# Patient Record
Sex: FEMALE | Race: WHITE | Employment: FULL TIME | URBAN - METROPOLITAN AREA
[De-identification: names, ages, dates, MRNs, and addresses within clinical notes are randomized per-mention and may not be internally consistent; named-entity substitution may affect disease eponyms.]

---

## 2017-01-06 RX ORDER — HYDROCHLOROTHIAZIDE 12.5 MG/1
CAPSULE, GELATIN COATED ORAL
Qty: 30 CAPSULE | Refills: 0 | Status: SHIPPED | OUTPATIENT
Start: 2017-01-06 | End: 2017-07-28

## 2017-02-10 ENCOUNTER — OFFICE VISIT (OUTPATIENT)
Dept: SURGERY | Facility: CLINIC | Age: 52
End: 2017-02-10
Attending: INTERNAL MEDICINE

## 2017-02-10 VITALS
HEIGHT: 62 IN | DIASTOLIC BLOOD PRESSURE: 87 MMHG | BODY MASS INDEX: 52.7 KG/M2 | WEIGHT: 286.38 LBS | RESPIRATION RATE: 18 BRPM | SYSTOLIC BLOOD PRESSURE: 126 MMHG | HEART RATE: 91 BPM | OXYGEN SATURATION: 95 %

## 2017-02-10 DIAGNOSIS — R60.0 BILATERAL EDEMA OF LOWER EXTREMITY: ICD-10-CM

## 2017-02-10 DIAGNOSIS — E78.1 HYPERTRIGLYCERIDEMIA: Primary | ICD-10-CM

## 2017-02-10 DIAGNOSIS — R63.2 BINGE EATING: ICD-10-CM

## 2017-02-10 DIAGNOSIS — Z99.89 OSA ON CPAP: ICD-10-CM

## 2017-02-10 DIAGNOSIS — Z51.81 ENCOUNTER FOR THERAPEUTIC DRUG MONITORING: ICD-10-CM

## 2017-02-10 DIAGNOSIS — G47.33 OSA ON CPAP: ICD-10-CM

## 2017-02-10 DIAGNOSIS — E66.01 MORBID OBESITY WITH BMI OF 50.0-59.9, ADULT (HCC): ICD-10-CM

## 2017-02-10 PROCEDURE — 99212 OFFICE O/P EST SF 10 MIN: CPT | Performed by: INTERNAL MEDICINE

## 2017-02-10 NOTE — PROGRESS NOTES
[address] [facility]  [address] [place] [address]  Dept: [phone]     Date:   2016    Patient:  Jasmin Villagran  :      10/22/1965  MRN:      [id number]    Chief Complaint:  Patient presents with: Caffeine Concern No     Social History Narrative     Surgical History:  No past surgical history on file.   Family History:    Family History   Problem Relation Age of Onset   • Pulmonary Disease       Emphysema  Family H/O   • Thyroid Disorder Mother to auscultation bilaterally  Heart: S1, S2 normal, no murmur, click, rub or gallop, regular rate and rhythm  Abdomen: soft, non-tender; bowel sounds normal; no masses,  no organomegaly  Pelvic: deferred  Extremities: edema trace    ASSESSMENT     HYPERCHOL Weekly   Recheck blood work once she is able to get insurance    Works as Mental Health Nurse. (currently seeking work)    Recommended Zoloft for SOTERO.  Wants to hold off for now    ekg done    Shila Greene MD

## 2017-04-27 ENCOUNTER — OFFICE VISIT (OUTPATIENT)
Dept: SURGERY | Facility: CLINIC | Age: 52
End: 2017-04-27

## 2017-04-27 VITALS
SYSTOLIC BLOOD PRESSURE: 136 MMHG | BODY MASS INDEX: 52.88 KG/M2 | DIASTOLIC BLOOD PRESSURE: 78 MMHG | HEART RATE: 92 BPM | RESPIRATION RATE: 18 BRPM | HEIGHT: 62 IN | WEIGHT: 287.38 LBS | OXYGEN SATURATION: 97 %

## 2017-04-27 DIAGNOSIS — G47.33 OSA ON CPAP: Primary | ICD-10-CM

## 2017-04-27 DIAGNOSIS — E55.9 VITAMIN D DEFICIENCY: ICD-10-CM

## 2017-04-27 DIAGNOSIS — R60.0 BILATERAL EDEMA OF LOWER EXTREMITY: ICD-10-CM

## 2017-04-27 DIAGNOSIS — Z51.81 ENCOUNTER FOR THERAPEUTIC DRUG MONITORING: ICD-10-CM

## 2017-04-27 DIAGNOSIS — E78.1 HYPERTRIGLYCERIDEMIA: ICD-10-CM

## 2017-04-27 DIAGNOSIS — Z99.89 OSA ON CPAP: Primary | ICD-10-CM

## 2017-04-27 DIAGNOSIS — E66.01 MORBID OBESITY WITH BMI OF 50.0-59.9, ADULT (HCC): ICD-10-CM

## 2017-04-27 PROCEDURE — 99212 OFFICE O/P EST SF 10 MIN: CPT | Performed by: INTERNAL MEDICINE

## 2017-04-27 NOTE — PROGRESS NOTES
[facility] BAR[facility]ATR[facility] AND [facility] CLINIC  [address] [facility] [address]  Dept: [phone]     Date:   2016    Patient:  Fang Atwood  :      10/22/1965  MRN:      [id number]    Chief Complaint:  Patient presents with: Erythromycin;  Penicillins     Social History:      Social History   Marital Status: Single  Spouse Name: N/A    Years of Education: N/A  Number of Children: N/A     Occupational History  None on file     Social History Main Topics   Smoking status: Never S Constitutional: positive for fatigue  Respiratory: negative  Cardiovascular: negative  Gastrointestinal: negative  Musculoskeletal:negative  Neurological: negative  Behavioral/Psych: negative  Endocrine: negative  All other systems were reviewed and are every day due to cramping    Goals for next month:  1. Keep a food log. 2. Drink 48-64 ounces of non-caloric beverages per day. No fruit juices or regular soda. 3. Increase activity-upper body exercises, walk 10 minutes per day.   4. Increase fruit and ve

## 2017-07-28 ENCOUNTER — OFFICE VISIT (OUTPATIENT)
Dept: SURGERY | Facility: CLINIC | Age: 52
End: 2017-07-28

## 2017-07-28 VITALS
WEIGHT: 284.31 LBS | HEIGHT: 62 IN | DIASTOLIC BLOOD PRESSURE: 84 MMHG | OXYGEN SATURATION: 96 % | SYSTOLIC BLOOD PRESSURE: 136 MMHG | BODY MASS INDEX: 52.32 KG/M2 | HEART RATE: 83 BPM

## 2017-07-28 DIAGNOSIS — G47.33 OSA ON CPAP: Primary | ICD-10-CM

## 2017-07-28 DIAGNOSIS — E55.9 VITAMIN D DEFICIENCY: ICD-10-CM

## 2017-07-28 DIAGNOSIS — Z99.89 OSA ON CPAP: Primary | ICD-10-CM

## 2017-07-28 DIAGNOSIS — Z51.81 ENCOUNTER FOR THERAPEUTIC DRUG MONITORING: ICD-10-CM

## 2017-07-28 DIAGNOSIS — E66.01 MORBID OBESITY WITH BMI OF 50.0-59.9, ADULT (HCC): ICD-10-CM

## 2017-07-28 DIAGNOSIS — R60.0 LOWER EXTREMITY EDEMA: ICD-10-CM

## 2017-07-28 DIAGNOSIS — E78.1 HYPERTRIGLYCERIDEMIA: ICD-10-CM

## 2017-07-28 PROCEDURE — 99212 OFFICE O/P EST SF 10 MIN: CPT | Performed by: INTERNAL MEDICINE

## 2017-07-28 RX ORDER — HYDROCHLOROTHIAZIDE 12.5 MG/1
CAPSULE, GELATIN COATED ORAL
Qty: 30 CAPSULE | Refills: 0 | Status: SHIPPED | OUTPATIENT
Start: 2017-07-28 | End: 2018-02-23

## 2017-07-28 NOTE — PROGRESS NOTES
[address] BAR[facility]ATRIC AND [facility] CLIN[facility]  [address] [place] [address]  Dept: [phone]     Date:   2016    Patient:  Alex Allen  :      10/22/1965  MRN:      [id number]    Chief Complaint:  Patient presents with: Other Topics Concern    Caffeine Concern No     Social History Narrative   None on file     Surgical History:  No past surgical history on file.   Family History:    Family History   Problem Relation Age of Onset   • Pulmonary Disease       Emphysema  F obvious abnormality, atraumatic  Lungs: clear to auscultation bilaterally  Heart: S1, S2 normal, no murmur, click, rub or gallop, regular rate and rhythm  Abdomen: soft, non-tender; bowel sounds normal; no masses,  no organomegaly  Pelvic: deferred  Extrem mg  Continue current dose    Blood work done  Patient's vitamin D level was low and will require Drisdol 50,000 I.U. 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

## 2017-08-14 ENCOUNTER — CHARTING TRANS (OUTPATIENT)
Dept: OTHER | Age: 52
End: 2017-08-14

## 2017-08-16 ENCOUNTER — CHARTING TRANS (OUTPATIENT)
Dept: OTHER | Age: 52
End: 2017-08-16

## 2017-08-21 ENCOUNTER — CHARTING TRANS (OUTPATIENT)
Dept: OTHER | Age: 52
End: 2017-08-21

## 2017-08-23 ENCOUNTER — CHARTING TRANS (OUTPATIENT)
Dept: OTHER | Age: 52
End: 2017-08-23

## 2017-08-25 ENCOUNTER — CHARTING TRANS (OUTPATIENT)
Dept: OTHER | Age: 52
End: 2017-08-25

## 2017-10-27 ENCOUNTER — OFFICE VISIT (OUTPATIENT)
Dept: SURGERY | Facility: CLINIC | Age: 52
End: 2017-10-27

## 2017-10-27 VITALS
RESPIRATION RATE: 16 BRPM | HEART RATE: 77 BPM | OXYGEN SATURATION: 95 % | HEIGHT: 62 IN | WEIGHT: 286.13 LBS | BODY MASS INDEX: 52.66 KG/M2 | SYSTOLIC BLOOD PRESSURE: 116 MMHG | DIASTOLIC BLOOD PRESSURE: 77 MMHG

## 2017-10-27 DIAGNOSIS — E66.01 MORBID OBESITY WITH BMI OF 50.0-59.9, ADULT (HCC): ICD-10-CM

## 2017-10-27 DIAGNOSIS — Z51.81 ENCOUNTER FOR THERAPEUTIC DRUG MONITORING: ICD-10-CM

## 2017-10-27 DIAGNOSIS — G47.33 OSA ON CPAP: Primary | ICD-10-CM

## 2017-10-27 DIAGNOSIS — R60.0 BILATERAL EDEMA OF LOWER EXTREMITY: ICD-10-CM

## 2017-10-27 DIAGNOSIS — E78.1 HYPERTRIGLYCERIDEMIA: ICD-10-CM

## 2017-10-27 DIAGNOSIS — R60.0 LOWER EXTREMITY EDEMA: ICD-10-CM

## 2017-10-27 DIAGNOSIS — Z99.89 OSA ON CPAP: Primary | ICD-10-CM

## 2017-10-27 PROCEDURE — 99212 OFFICE O/P EST SF 10 MIN: CPT | Performed by: INTERNAL MEDICINE

## 2017-10-27 RX ORDER — HYDROCHLOROTHIAZIDE 12.5 MG/1
12.5 CAPSULE, GELATIN COATED ORAL DAILY
Qty: 30 CAPSULE | Refills: 1 | Status: SHIPPED | OUTPATIENT
Start: 2017-10-27 | End: 2018-11-30

## 2017-10-27 NOTE — PROGRESS NOTES
[address] BAR[facility]ATRIC AND [facility] CLINIC  [address] [place] [address]  Dept: [phone]     Date:   2016    Patient:  Luis Fernando Mccloud  :      10/22/1965  MRN:      [id number]    Chief Complaint:  Patient presents with: Unknown     Other Topics Concern    Caffeine Concern No     Social History Narrative   None on file     Surgical History:  No past surgical history on file.   Family History:    Family History   Problem Relation Age of Onset   • Pulmonary Disease       Emph without obvious abnormality, atraumatic  Lungs: clear to auscultation bilaterally  Heart: S1, S2 normal, no murmur, click, rub or gallop, regular rate and rhythm  Abdomen: soft, non-tender; bowel sounds normal; no masses,  no organomegaly  Pelvic: deferred current dose    Blood work done  Patient's vitamin D level was low and will require Drisdol 50,000 I.U. Weekly   Recheck blood work once she is able to get insurance    Works as Mental Health Nurse. Working part time    Recommended Zoloft for SOTERO.  Wants to

## 2018-02-23 ENCOUNTER — OFFICE VISIT (OUTPATIENT)
Dept: SURGERY | Facility: CLINIC | Age: 53
End: 2018-02-23

## 2018-02-23 VITALS
RESPIRATION RATE: 16 BRPM | WEIGHT: 293 LBS | BODY MASS INDEX: 53.92 KG/M2 | HEIGHT: 62 IN | DIASTOLIC BLOOD PRESSURE: 71 MMHG | SYSTOLIC BLOOD PRESSURE: 99 MMHG | HEART RATE: 84 BPM | OXYGEN SATURATION: 94 %

## 2018-02-23 DIAGNOSIS — R60.0 LOWER EXTREMITY EDEMA: ICD-10-CM

## 2018-02-23 DIAGNOSIS — E66.01 MORBID OBESITY WITH BMI OF 50.0-59.9, ADULT (HCC): ICD-10-CM

## 2018-02-23 DIAGNOSIS — E78.1 HYPERTRIGLYCERIDEMIA: Primary | ICD-10-CM

## 2018-02-23 DIAGNOSIS — Z99.89 OSA ON CPAP: ICD-10-CM

## 2018-02-23 DIAGNOSIS — G47.33 OSA ON CPAP: ICD-10-CM

## 2018-02-23 DIAGNOSIS — F50.81 BINGE EATING DISORDER: ICD-10-CM

## 2018-02-23 DIAGNOSIS — Z51.81 ENCOUNTER FOR THERAPEUTIC DRUG MONITORING: ICD-10-CM

## 2018-02-23 PROBLEM — F50.819 BINGE EATING DISORDER: Status: ACTIVE | Noted: 2018-02-23

## 2018-02-23 PROCEDURE — 99212 OFFICE O/P EST SF 10 MIN: CPT | Performed by: INTERNAL MEDICINE

## 2018-02-23 NOTE — PROGRESS NOTES
300 [facility]  300 [facility] BARIATRIC AND WEIGHT LOSS CLINIC  [address] [place] [address]  Dept: [phone]     Date:   2016    Patient:  Shauna Obrien  :      10/22/1965  MRN:      [id number]    Chief Complaint:  Patient presents with: Family History   Problem Relation Age of Onset   • Pulmonary Disease       Emphysema  Family H/O   • Thyroid Disorder Mother      Hypothyroid   • Thyroid Disorder Sister      Hypothryroid       Food Journal  · Reviewed and Discussed:       · Patient has non-tender; bowel sounds normal; no masses,  no organomegaly  Pelvic: deferred  Extremities: edema trace    ASSESSMENT     HYPERCHOLESTEROLEMIA:  The patient states that her cholesterol has been well controlled on her current medication.       Lab Results insurance    Works as Mental Health Nurse. Working part time    Recommended Zoloft for SOTERO.  Wants to hold off for now    ekg done    Franky Izaguirre MD

## 2018-05-11 ENCOUNTER — OFFICE VISIT (OUTPATIENT)
Dept: SURGERY | Facility: CLINIC | Age: 53
End: 2018-05-11

## 2018-05-11 VITALS
WEIGHT: 292 LBS | OXYGEN SATURATION: 93 % | SYSTOLIC BLOOD PRESSURE: 108 MMHG | DIASTOLIC BLOOD PRESSURE: 74 MMHG | BODY MASS INDEX: 53.73 KG/M2 | HEIGHT: 62 IN | HEART RATE: 85 BPM | RESPIRATION RATE: 16 BRPM

## 2018-05-11 DIAGNOSIS — Z99.89 OSA ON CPAP: ICD-10-CM

## 2018-05-11 DIAGNOSIS — Z51.81 ENCOUNTER FOR THERAPEUTIC DRUG MONITORING: ICD-10-CM

## 2018-05-11 DIAGNOSIS — E78.1 HYPERTRIGLYCERIDEMIA: Primary | ICD-10-CM

## 2018-05-11 DIAGNOSIS — G47.33 OSA ON CPAP: ICD-10-CM

## 2018-05-11 DIAGNOSIS — F50.81 BINGE EATING DISORDER: ICD-10-CM

## 2018-05-11 DIAGNOSIS — E66.01 MORBID OBESITY WITH BMI OF 50.0-59.9, ADULT (HCC): ICD-10-CM

## 2018-05-11 DIAGNOSIS — R60.0 LOWER EXTREMITY EDEMA: ICD-10-CM

## 2018-05-11 PROCEDURE — 99212 OFFICE O/P EST SF 10 MIN: CPT | Performed by: INTERNAL MEDICINE

## 2018-05-11 RX ORDER — HYDROCODONE BITARTRATE AND ACETAMINOPHEN 7.5; 3 MG/1; MG/1
TABLET ORAL
Refills: 0 | COMMUNITY
Start: 2018-05-07 | End: 2019-06-07 | Stop reason: ALTCHOICE

## 2018-05-11 NOTE — PROGRESS NOTES
[facility] BARIATRIC AND WEIGHT LOSS CLINIC  [address] [address]  Dept: [phone]     Date:   2016    Patient:  Shauna Obrien  :      10/22/1965  MRN:      [id number]    Chief Complaint:  Patient presents with: use No    Drug use: Unknown     Other Topics Concern    Caffeine Concern No     Social History Narrative   None on file     Surgical History:  No past surgical history on file.   Family History:    Family History   Problem Relation Age of Onset   • Pulmonar age and cooperative  Head: Normocephalic, without obvious abnormality, atraumatic  Lungs: clear to auscultation bilaterally  Heart: S1, S2 normal, no murmur, click, rub or gallop, regular rate and rhythm  Abdomen: soft, non-tender; bowel sounds normal; no per day. Doing well    Tolerating vyanase 40 mg  Continue current dose    Blood work done  Patient's vitamin D level was low and will require Drisdol 50,000 I.U.  Weekly   Recheck blood work once she is able to get insurance    Works as [employer]

## 2018-09-05 ENCOUNTER — OFFICE VISIT (OUTPATIENT)
Dept: SURGERY | Facility: CLINIC | Age: 53
End: 2018-09-05
Payer: COMMERCIAL

## 2018-09-05 VITALS
HEIGHT: 62 IN | RESPIRATION RATE: 18 BRPM | BODY MASS INDEX: 53.92 KG/M2 | WEIGHT: 293 LBS | SYSTOLIC BLOOD PRESSURE: 123 MMHG | DIASTOLIC BLOOD PRESSURE: 78 MMHG | HEART RATE: 86 BPM

## 2018-09-05 DIAGNOSIS — E66.01 MORBID OBESITY WITH BMI OF 50.0-59.9, ADULT (HCC): ICD-10-CM

## 2018-09-05 DIAGNOSIS — E55.9 VITAMIN D DEFICIENCY: ICD-10-CM

## 2018-09-05 DIAGNOSIS — E78.1 HYPERTRIGLYCERIDEMIA: Primary | ICD-10-CM

## 2018-09-05 DIAGNOSIS — Z51.81 ENCOUNTER FOR THERAPEUTIC DRUG MONITORING: ICD-10-CM

## 2018-09-05 DIAGNOSIS — G47.33 OSA ON CPAP: ICD-10-CM

## 2018-09-05 DIAGNOSIS — Z99.89 OSA ON CPAP: ICD-10-CM

## 2018-09-05 DIAGNOSIS — R60.0 LOWER EXTREMITY EDEMA: ICD-10-CM

## 2018-09-05 DIAGNOSIS — F50.81 BINGE EATING DISORDER: ICD-10-CM

## 2018-09-05 PROCEDURE — 99212 OFFICE O/P EST SF 10 MIN: CPT | Performed by: INTERNAL MEDICINE

## 2018-09-05 NOTE — PROGRESS NOTES
[address] BARIATRIC AND WEIGHT LOSS CLINIC  [address] [place] [address]  Dept: [phone]     Date:   2016    Patient:  Bunny Mena  :      10/22/1965  MRN:      [id number]    Chief Complaint:  Patient presents with: History Narrative   None on file     Surgical History:  No past surgical history on file.   Family History:    Family History   Problem Relation Age of Onset   • Pulmonary Disease       Emphysema  Family H/O   • Thyroid Disorder Mother      Hypothyroid   • atraumatic  Lungs: clear to auscultation bilaterally  Heart: S1, S2 normal, no murmur, click, rub or gallop, regular rate and rhythm  Abdomen: soft, non-tender; bowel sounds normal; no masses,  no organomegaly  Pelvic: deferred  Extremities: edema trace mg  Continue current dose    Blood work due  [address] blood work once she is able to get insurance    Recommend following up with Dr Yaneth Garzon for sleep apnea  May need a new mask    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

## 2018-10-08 ENCOUNTER — CHARTING TRANS (OUTPATIENT)
Dept: OTHER | Age: 53
End: 2018-10-08

## 2018-10-10 ENCOUNTER — CHARTING TRANS (OUTPATIENT)
Dept: OTHER | Age: 53
End: 2018-10-10

## 2018-11-30 ENCOUNTER — OFFICE VISIT (OUTPATIENT)
Dept: SURGERY | Facility: CLINIC | Age: 53
End: 2018-11-30
Payer: COMMERCIAL

## 2018-11-30 VITALS
DIASTOLIC BLOOD PRESSURE: 79 MMHG | WEIGHT: 293 LBS | BODY MASS INDEX: 53.92 KG/M2 | RESPIRATION RATE: 18 BRPM | HEART RATE: 80 BPM | SYSTOLIC BLOOD PRESSURE: 120 MMHG | OXYGEN SATURATION: 99 % | HEIGHT: 62 IN

## 2018-11-30 DIAGNOSIS — F50.81 BINGE EATING DISORDER: ICD-10-CM

## 2018-11-30 DIAGNOSIS — E78.1 HYPERTRIGLYCERIDEMIA: Primary | ICD-10-CM

## 2018-11-30 DIAGNOSIS — G47.33 OSA ON CPAP: ICD-10-CM

## 2018-11-30 DIAGNOSIS — R60.0 LOWER EXTREMITY EDEMA: ICD-10-CM

## 2018-11-30 DIAGNOSIS — Z51.81 ENCOUNTER FOR THERAPEUTIC DRUG MONITORING: ICD-10-CM

## 2018-11-30 DIAGNOSIS — R60.0 BILATERAL EDEMA OF LOWER EXTREMITY: ICD-10-CM

## 2018-11-30 DIAGNOSIS — E66.01 MORBID OBESITY WITH BMI OF 50.0-59.9, ADULT (HCC): ICD-10-CM

## 2018-11-30 DIAGNOSIS — Z99.89 OSA ON CPAP: ICD-10-CM

## 2018-11-30 PROCEDURE — 99212 OFFICE O/P EST SF 10 MIN: CPT | Performed by: INTERNAL MEDICINE

## 2018-11-30 RX ORDER — HYDROCHLOROTHIAZIDE 12.5 MG/1
12.5 CAPSULE, GELATIN COATED ORAL DAILY
Qty: 30 CAPSULE | Refills: 1 | Status: SHIPPED | OUTPATIENT
Start: 2018-11-30 | End: 2020-06-15

## 2018-11-30 NOTE — PROGRESS NOTES
[address] BARIATRIC AND WEIGHT LOSS CLINIC  [address] [place] [address]  Dept: [phone]     Date:   2016    Patient:  David Murrell  :      10/22/1965  MRN:      [id number]    Chief Complaint:  Patient presents with: needs - non-medical: Not on file    Occupational History      Not on file    Tobacco Use      Smoking status: Never Smoker      Smokeless tobacco: Never Used    Substance and Sexual Activity      Alcohol use: No      Drug use: Not on file      Sexual activ labels, Drink 64 oz of water per day, Maintain a daily food journal, No drinking 30 minutes before or after meals, Utlize portion control strategies to reduce calorie intake, Identify triggers for eating and manage cues and Eat slowly and take 20 to 30 min meal plan and medication. Liver function stable.     Lab Results   Component Value Date/Time    CHOLEST 155 03/04/2016 12:37 PM    LDL 93 03/04/2016 12:37 PM    HDL 42 03/04/2016 12:37 PM    TRIG 101 03/04/2016 12:37 PM       Patient was instructed to nina

## 2019-03-01 ENCOUNTER — OFFICE VISIT (OUTPATIENT)
Dept: SURGERY | Facility: CLINIC | Age: 54
End: 2019-03-01
Payer: COMMERCIAL

## 2019-03-01 ENCOUNTER — APPOINTMENT (OUTPATIENT)
Dept: LAB | Facility: HOSPITAL | Age: 54
End: 2019-03-01
Attending: INTERNAL MEDICINE
Payer: COMMERCIAL

## 2019-03-01 VITALS
SYSTOLIC BLOOD PRESSURE: 125 MMHG | DIASTOLIC BLOOD PRESSURE: 84 MMHG | HEIGHT: 62 IN | BODY MASS INDEX: 53.92 KG/M2 | HEART RATE: 91 BPM | WEIGHT: 293 LBS | OXYGEN SATURATION: 95 % | RESPIRATION RATE: 16 BRPM

## 2019-03-01 DIAGNOSIS — E53.8 LOW VITAMIN B12 LEVEL: ICD-10-CM

## 2019-03-01 DIAGNOSIS — E55.9 VITAMIN D DEFICIENCY: ICD-10-CM

## 2019-03-01 DIAGNOSIS — R73.09 ABNORMAL BLOOD SUGAR: ICD-10-CM

## 2019-03-01 DIAGNOSIS — Z51.81 ENCOUNTER FOR THERAPEUTIC DRUG MONITORING: ICD-10-CM

## 2019-03-01 DIAGNOSIS — G47.33 OSA ON CPAP: ICD-10-CM

## 2019-03-01 DIAGNOSIS — E78.1 HYPERTRIGLYCERIDEMIA: ICD-10-CM

## 2019-03-01 DIAGNOSIS — F50.81 BINGE EATING DISORDER: ICD-10-CM

## 2019-03-01 DIAGNOSIS — R60.0 LOWER EXTREMITY EDEMA: ICD-10-CM

## 2019-03-01 DIAGNOSIS — Z99.89 OSA ON CPAP: ICD-10-CM

## 2019-03-01 DIAGNOSIS — E66.01 MORBID OBESITY WITH BMI OF 50.0-59.9, ADULT (HCC): ICD-10-CM

## 2019-03-01 DIAGNOSIS — E78.1 HYPERTRIGLYCERIDEMIA: Primary | ICD-10-CM

## 2019-03-01 LAB
ALBUMIN SERPL-MCNC: 3.6 G/DL (ref 3.4–5)
ALBUMIN/GLOB SERPL: 0.9 {RATIO} (ref 1–2)
ALP LIVER SERPL-CCNC: 79 U/L (ref 41–108)
ALT SERPL-CCNC: 44 U/L (ref 13–56)
ANION GAP SERPL CALC-SCNC: 7 MMOL/L (ref 0–18)
AST SERPL-CCNC: 28 U/L (ref 15–37)
BILIRUB SERPL-MCNC: 0.7 MG/DL (ref 0.1–2)
BUN BLD-MCNC: 13 MG/DL (ref 7–18)
BUN/CREAT SERPL: 12.9 (ref 10–20)
CALCIUM BLD-MCNC: 8.8 MG/DL (ref 8.5–10.1)
CHLORIDE SERPL-SCNC: 104 MMOL/L (ref 98–107)
CHOLEST SMN-MCNC: 180 MG/DL (ref ?–200)
CO2 SERPL-SCNC: 30 MMOL/L (ref 21–32)
CREAT BLD-MCNC: 1.01 MG/DL (ref 0.55–1.02)
DEPRECATED RDW RBC AUTO: 42.9 FL (ref 35.1–46.3)
ERYTHROCYTE [DISTWIDTH] IN BLOOD BY AUTOMATED COUNT: 13.2 % (ref 11–15)
EST. AVERAGE GLUCOSE BLD GHB EST-MCNC: 131 MG/DL (ref 68–126)
GLOBULIN PLAS-MCNC: 4.2 G/DL (ref 2.8–4.4)
GLUCOSE BLD-MCNC: 118 MG/DL (ref 70–99)
HBA1C MFR BLD HPLC: 6.2 % (ref ?–5.7)
HCT VFR BLD AUTO: 46.4 % (ref 35–48)
HDLC SERPL-MCNC: 43 MG/DL (ref 40–59)
HGB BLD-MCNC: 14.8 G/DL (ref 12–16)
LDLC SERPL CALC-MCNC: 111 MG/DL (ref ?–100)
M PROTEIN MFR SERPL ELPH: 7.8 G/DL (ref 6.4–8.2)
MCH RBC QN AUTO: 28.4 PG (ref 26–34)
MCHC RBC AUTO-ENTMCNC: 31.9 G/DL (ref 31–37)
MCV RBC AUTO: 88.9 FL (ref 80–100)
NONHDLC SERPL-MCNC: 137 MG/DL (ref ?–130)
OSMOLALITY SERPL CALC.SUM OF ELEC: 293 MOSM/KG (ref 275–295)
PLATELET # BLD AUTO: 260 10(3)UL (ref 150–450)
POTASSIUM SERPL-SCNC: 3.7 MMOL/L (ref 3.5–5.1)
RBC # BLD AUTO: 5.22 X10(6)UL (ref 3.8–5.3)
SODIUM SERPL-SCNC: 141 MMOL/L (ref 136–145)
TRIGL SERPL-MCNC: 130 MG/DL (ref 30–149)
TSI SER-ACNC: 2.71 MIU/ML (ref 0.36–3.74)
VIT B12 SERPL-MCNC: >2000 PG/ML (ref 193–986)
VLDLC SERPL CALC-MCNC: 26 MG/DL (ref 0–30)
WBC # BLD AUTO: 5.8 X10(3) UL (ref 4–11)

## 2019-03-01 PROCEDURE — 83036 HEMOGLOBIN GLYCOSYLATED A1C: CPT

## 2019-03-01 PROCEDURE — 82306 VITAMIN D 25 HYDROXY: CPT

## 2019-03-01 PROCEDURE — 80061 LIPID PANEL: CPT

## 2019-03-01 PROCEDURE — 82607 VITAMIN B-12: CPT

## 2019-03-01 PROCEDURE — 85027 COMPLETE CBC AUTOMATED: CPT

## 2019-03-01 PROCEDURE — 99214 OFFICE O/P EST MOD 30 MIN: CPT | Performed by: INTERNAL MEDICINE

## 2019-03-01 PROCEDURE — 36415 COLL VENOUS BLD VENIPUNCTURE: CPT

## 2019-03-01 PROCEDURE — 93005 ELECTROCARDIOGRAM TRACING: CPT

## 2019-03-01 PROCEDURE — 93010 ELECTROCARDIOGRAM REPORT: CPT | Performed by: INTERNAL MEDICINE

## 2019-03-01 PROCEDURE — 80053 COMPREHEN METABOLIC PANEL: CPT

## 2019-03-01 PROCEDURE — 84443 ASSAY THYROID STIM HORMONE: CPT

## 2019-03-01 NOTE — PROGRESS NOTES
[address] BARIATRIC AND WEIGHT LOSS CLINIC  [address] [place] [address]  Dept: [phone]     Date:   2016    Patient:  Thierno Schmitz  :      10/22/1965  MRN:      [id number]    Chief Complaint:  Patient presents with: Transportation needs:        Medical: Not on file        Non-medical: Not on file    Tobacco Use      Smoking status: Never Smoker      Smokeless tobacco: Never Used    Substance and Sexual Activity      Alcohol use: No      Drug use: Not on file      Sexu patient exercising? no  · Type of exercise? adl's    Eating Habits  · Patient states the following:  · Eats 2 meal(s) per day  · Length of time it takes to consume a meal:  20  · # of snacks per day: 1 Type of snacks:    · Amount of soda consumption per da 03/04/2016 12:37 PM    TRIG 101 03/04/2016 12:37 PM       OBSTRUCTIVE SLEEP APNEA: The patient states her sleep apnea has been stable since the last clinic visit. There has not been any increase in hyper-somnolence.      Encounter Diagnosis(ses):   Hypertri day. No fruit juices or regular soda. 3. Increase activity-upper body exercises, walk 10 minutes per day. 4. Increase fruit and vegetable servings to 5-6 per day.       Doing well    Tolerating vyanase   Increase to 50 mg    Blood work ordered  ekg needed

## 2019-03-04 LAB — 25(OH)D3 SERPL-MCNC: 10.1 NG/ML (ref 30–100)

## 2019-03-22 ENCOUNTER — TELEPHONE (OUTPATIENT)
Dept: SURGERY | Facility: CLINIC | Age: 54
End: 2019-03-22

## 2019-03-22 NOTE — TELEPHONE ENCOUNTER
DR HUTTON,   PATIENT IS UNABLE TO FIND HER LAST 2 PRESCRIPTIONS. PLEASE REPRINT 2 PRESCRIPTIONS TO LAST UNTIL HER LAST APPOINTMENT. THANK YOU.

## 2019-04-16 ENCOUNTER — OFFICE VISIT (OUTPATIENT)
Dept: PULMONOLOGY | Facility: CLINIC | Age: 54
End: 2019-04-16
Payer: COMMERCIAL

## 2019-04-16 VITALS
HEIGHT: 62 IN | SYSTOLIC BLOOD PRESSURE: 134 MMHG | DIASTOLIC BLOOD PRESSURE: 83 MMHG | HEART RATE: 110 BPM | OXYGEN SATURATION: 96 % | WEIGHT: 293 LBS | BODY MASS INDEX: 53.92 KG/M2

## 2019-04-16 DIAGNOSIS — Z99.89 OSA ON CPAP: Primary | ICD-10-CM

## 2019-04-16 DIAGNOSIS — G47.33 OSA ON CPAP: Primary | ICD-10-CM

## 2019-04-16 PROCEDURE — 99213 OFFICE O/P EST LOW 20 MIN: CPT | Performed by: INTERNAL MEDICINE

## 2019-04-16 PROCEDURE — 99212 OFFICE O/P EST SF 10 MIN: CPT | Performed by: INTERNAL MEDICINE

## 2019-04-16 NOTE — PROGRESS NOTES
.The patient is a 50-year-old female who I know well from prior evaluation but I have not seen her in over 5 years.   She is vigilant with the use of her CPAP every night all night and I have reviewed her downloaded data and residual events are 6/h but she

## 2019-05-06 ENCOUNTER — TELEPHONE (OUTPATIENT)
Dept: PULMONOLOGY | Facility: CLINIC | Age: 54
End: 2019-05-06

## 2019-05-06 DIAGNOSIS — G47.33 OSA (OBSTRUCTIVE SLEEP APNEA): Primary | ICD-10-CM

## 2019-05-06 NOTE — TELEPHONE ENCOUNTER
Pt. requesting to get an order to get a new cpap machine, and supplies and new pressure setting of 8cwp. Pt.  Also states that notes showing that pt does use the cpap machine must be sent to [facility] - fax # [phone] - Office phone

## 2019-05-07 NOTE — TELEPHONE ENCOUNTER
Order, face sheet, and progress note faxed to [facility] [phone]. Pt informed of this. Pt verbalized understanding.

## 2019-05-13 NOTE — TELEPHONE ENCOUNTER
Mary/Hoang requesting face to face notes stating pt is receiving CPAP replacement.  Fax:[phone]

## 2019-05-14 NOTE — TELEPHONE ENCOUNTER
Mary/Hoang returned call and states that notes need to state specifically that this order is for CPAP replacement. Please fax to [phone]/[facility]CV: Ewa Maciel. Please call Ewa Maciel at [phone] to let her know notes were faxed.

## 2019-06-03 NOTE — TELEPHONE ENCOUNTER
Addendum faxed to [facility] at [facility] [phone]. Fax confirmation received.

## 2019-06-07 ENCOUNTER — OFFICE VISIT (OUTPATIENT)
Dept: SURGERY | Facility: CLINIC | Age: 54
End: 2019-06-07
Payer: COMMERCIAL

## 2019-06-07 VITALS
SYSTOLIC BLOOD PRESSURE: 128 MMHG | HEIGHT: 62 IN | RESPIRATION RATE: 16 BRPM | WEIGHT: 293 LBS | DIASTOLIC BLOOD PRESSURE: 76 MMHG | OXYGEN SATURATION: 96 % | BODY MASS INDEX: 53.92 KG/M2 | HEART RATE: 86 BPM

## 2019-06-07 DIAGNOSIS — E66.01 MORBID OBESITY WITH BMI OF 50.0-59.9, ADULT (HCC): ICD-10-CM

## 2019-06-07 DIAGNOSIS — F50.81 BINGE EATING DISORDER: ICD-10-CM

## 2019-06-07 DIAGNOSIS — E78.1 HYPERTRIGLYCERIDEMIA: ICD-10-CM

## 2019-06-07 DIAGNOSIS — Z99.89 OSA ON CPAP: Primary | ICD-10-CM

## 2019-06-07 DIAGNOSIS — G47.33 OSA ON CPAP: Primary | ICD-10-CM

## 2019-06-07 DIAGNOSIS — R60.0 LOWER EXTREMITY EDEMA: ICD-10-CM

## 2019-06-07 DIAGNOSIS — Z51.81 ENCOUNTER FOR THERAPEUTIC DRUG MONITORING: ICD-10-CM

## 2019-06-07 PROCEDURE — 99214 OFFICE O/P EST MOD 30 MIN: CPT | Performed by: INTERNAL MEDICINE

## 2019-06-07 NOTE — PROGRESS NOTES
[facility] BARIATRIC AND WEIGHT LOSS CLINIC  [address] [address]  Dept: [phone]     Date:   2016    Patient:  Mannie Borrego  :      10/22/1965  MRN:      [id number]    Chief Complaint:  Patient presents with: Alcohol use: No      Drug use: Not on file      Sexual activity: Not on file    Lifestyle      Physical activity:        Days per week: Not on file        Minutes per session: Not on file      Stress: Not on file    Relationships      Social connections: of snacks:    · Amount of soda consumption per day:  diet  · Amount of water (in ounces) per day:  40  · Drinking between meals only:  yes  · Toughest challenge:  exercise    Nutritional Goals  Limit carbohydrates to 100 gms per day, Eat 100-200 calories w been any increase in hyper-somnolence.      Encounter Diagnosis(ses):   Josep on cpap  (primary encounter diagnosis)  Binge eating disorder  Lower extremity edema  Encounter for therapeutic drug monitoring  Morbid obesity with bmi of 50.0-59.9, adult (hcc)  H

## 2019-09-20 ENCOUNTER — OFFICE VISIT (OUTPATIENT)
Dept: SURGERY | Facility: CLINIC | Age: 54
End: 2019-09-20
Payer: COMMERCIAL

## 2019-09-20 VITALS
OXYGEN SATURATION: 94 % | SYSTOLIC BLOOD PRESSURE: 121 MMHG | HEART RATE: 84 BPM | HEIGHT: 62 IN | BODY MASS INDEX: 53.92 KG/M2 | DIASTOLIC BLOOD PRESSURE: 74 MMHG | WEIGHT: 293 LBS

## 2019-09-20 DIAGNOSIS — R60.0 LOWER EXTREMITY EDEMA: ICD-10-CM

## 2019-09-20 DIAGNOSIS — Z51.81 ENCOUNTER FOR THERAPEUTIC DRUG MONITORING: ICD-10-CM

## 2019-09-20 DIAGNOSIS — F50.81 BINGE EATING DISORDER: ICD-10-CM

## 2019-09-20 DIAGNOSIS — Z99.89 OSA ON CPAP: Primary | ICD-10-CM

## 2019-09-20 DIAGNOSIS — E66.01 MORBID OBESITY WITH BMI OF 50.0-59.9, ADULT (HCC): ICD-10-CM

## 2019-09-20 DIAGNOSIS — E78.1 HYPERTRIGLYCERIDEMIA: ICD-10-CM

## 2019-09-20 DIAGNOSIS — G47.33 OSA ON CPAP: Primary | ICD-10-CM

## 2019-09-20 PROCEDURE — 99214 OFFICE O/P EST MOD 30 MIN: CPT | Performed by: INTERNAL MEDICINE

## 2019-09-20 NOTE — PROGRESS NOTES
[facility] BARIATRIC AND WEIGHT LOSS CLINIC  [address] [place] [address]  Dept: [phone]     Date:   2016    Patient:  Yuriy Hua  :      10/22/1965  MRN:      [id number]    Chief Complaint:  Patient presents with: Non-medical: Not on file    Tobacco Use      Smoking status: Never Smoker      Smokeless tobacco: Never Used    Substance and Sexual Activity      Alcohol use: No      Drug use: Not on file      Sexual activity: Not on file    Lifestyle      Physical dogs  · Goes to [employer] Habits  · Patient states the following:  · Eats 2 meal(s) per day  · Length of time it takes to consume a meal:  20  · # of snacks per day: 1 Type of snacks:    · Amount of soda consumption per day:  diet  · Amount of w 03/01/2019 01:03 PM    VLDL 26 03/01/2019 01:03 PM       OBSTRUCTIVE SLEEP APNEA: The patient states her sleep apnea has been stable since the last clinic visit. There has not been any increase in hyper-somnolence.      Encounter Diagnosis(ses):   Josep on cp

## 2019-12-20 ENCOUNTER — OFFICE VISIT (OUTPATIENT)
Dept: SURGERY | Facility: CLINIC | Age: 54
End: 2019-12-20
Payer: COMMERCIAL

## 2019-12-20 VITALS
BODY MASS INDEX: 53.79 KG/M2 | HEIGHT: 62 IN | WEIGHT: 292.31 LBS | HEART RATE: 94 BPM | DIASTOLIC BLOOD PRESSURE: 80 MMHG | SYSTOLIC BLOOD PRESSURE: 124 MMHG | OXYGEN SATURATION: 95 %

## 2019-12-20 DIAGNOSIS — Z99.89 OSA ON CPAP: Primary | ICD-10-CM

## 2019-12-20 DIAGNOSIS — E66.01 MORBID OBESITY WITH BMI OF 50.0-59.9, ADULT (HCC): ICD-10-CM

## 2019-12-20 DIAGNOSIS — R60.0 LOWER EXTREMITY EDEMA: ICD-10-CM

## 2019-12-20 DIAGNOSIS — E78.1 HYPERTRIGLYCERIDEMIA: ICD-10-CM

## 2019-12-20 DIAGNOSIS — F50.81 BINGE EATING DISORDER: ICD-10-CM

## 2019-12-20 DIAGNOSIS — Z51.81 ENCOUNTER FOR THERAPEUTIC DRUG MONITORING: ICD-10-CM

## 2019-12-20 DIAGNOSIS — G47.33 OSA ON CPAP: Primary | ICD-10-CM

## 2019-12-20 PROCEDURE — 99214 OFFICE O/P EST MOD 30 MIN: CPT | Performed by: INTERNAL MEDICINE

## 2019-12-20 NOTE — PROGRESS NOTES
[address] [place] [address] BARIATRIC AND WEIGHT LOSS CLINIC  [address] [place] [address]  Dept: [phone]         Patient:  Silvia Gomez  :      10/22/1965  MRN:      [id number]    Chief Complaint:  Patient presents with:   Follow - Up  Kendra Chambers on file        Inability: Not on file      Transportation needs:        Medical: Not on file        Non-medical: Not on file    Tobacco Use      Smoking status: Never Smoker      Smokeless tobacco: Never Used    Substance and Sexual Activity      Alcohol u Intake:     · Average CHO Intake: 110  · Is patient exercising? yes  · Type of exercise?  Walking dogs  · Goes to [employer] Habits  · Patient states the following:  · Eats 2 meal(s) per day  · Length of time it takes to consume a meal:  20  · # o 03/01/2019 01:03 PM     (H) 03/01/2019 01:03 PM    HDL 43 03/01/2019 01:03 PM    TRIG 130 03/01/2019 01:03 PM    VLDL 26 03/01/2019 01:03 PM       OBSTRUCTIVE SLEEP APNEA: The patient states her sleep apnea has been stable since the last clinic visi

## 2020-01-09 ENCOUNTER — TELEPHONE (OUTPATIENT)
Dept: SURGERY | Facility: CLINIC | Age: 55
End: 2020-01-09

## 2020-01-09 NOTE — TELEPHONE ENCOUNTER
PATIENT WAS HAVING PROBLEMS WITH PREVIOUS PHARMACY. PLEASE SEND THE JAN AND FEB RX TO THE [facility]. THANK YOU.

## 2020-03-18 ENCOUNTER — TELEPHONE (OUTPATIENT)
Dept: SURGERY | Facility: CLINIC | Age: 55
End: 2020-03-18

## 2020-06-15 ENCOUNTER — OFFICE VISIT (OUTPATIENT)
Dept: SURGERY | Facility: CLINIC | Age: 55
End: 2020-06-15
Payer: COMMERCIAL

## 2020-06-15 VITALS
HEIGHT: 62 IN | BODY MASS INDEX: 53.92 KG/M2 | SYSTOLIC BLOOD PRESSURE: 130 MMHG | WEIGHT: 293 LBS | DIASTOLIC BLOOD PRESSURE: 70 MMHG

## 2020-06-15 DIAGNOSIS — F50.81 BINGE EATING DISORDER: ICD-10-CM

## 2020-06-15 DIAGNOSIS — Z51.81 ENCOUNTER FOR THERAPEUTIC DRUG MONITORING: ICD-10-CM

## 2020-06-15 DIAGNOSIS — E66.01 MORBID OBESITY WITH BMI OF 50.0-59.9, ADULT (HCC): ICD-10-CM

## 2020-06-15 DIAGNOSIS — R60.0 LOWER EXTREMITY EDEMA: ICD-10-CM

## 2020-06-15 DIAGNOSIS — E78.1 HYPERTRIGLYCERIDEMIA: Primary | ICD-10-CM

## 2020-06-15 DIAGNOSIS — Z99.89 OSA ON CPAP: ICD-10-CM

## 2020-06-15 DIAGNOSIS — G47.33 OSA ON CPAP: ICD-10-CM

## 2020-06-15 DIAGNOSIS — R60.0 BILATERAL EDEMA OF LOWER EXTREMITY: ICD-10-CM

## 2020-06-15 PROCEDURE — 99214 OFFICE O/P EST MOD 30 MIN: CPT | Performed by: INTERNAL MEDICINE

## 2020-06-15 RX ORDER — HYDROCHLOROTHIAZIDE 12.5 MG/1
12.5 CAPSULE, GELATIN COATED ORAL DAILY
Qty: 30 CAPSULE | Refills: 1 | Status: SHIPPED | OUTPATIENT
Start: 2020-06-15 | End: 2021-10-14
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
## 2020-09-18 NOTE — PROGRESS NOTES
[address] BARIATRIC AND WEIGHT LOSS CLINIC  [address] [place] [address]  Dept: [phone]         Patient:  Alek Wiggins  :      10/22/1965  MRN:      [id number]    Chief Complaint:  Patient presents with:   Follow - Up: Cortney Edward tobacco: Never Used    Substance and Sexual Activity      Alcohol use: No      Drug use: Not on file      Sexual activity: Not on file    Lifestyle      Physical activity:        Days per week: Not on file        Minutes per session: Not on file      Stres per day  · Length of time it takes to consume a meal:  20  · # of snacks per day: 1 Type of snacks:    · Amount of soda consumption per day:  diet  · Amount of water (in ounces) per day:  40  · Drinking between meals only:  yes  · Toughest challenge:  exer states her sleep apnea has been stable since the last clinic visit. There has not been any increase in hyper-somnolence.      Encounter Diagnosis(ses):   Binge eating disorder  (primary encounter diagnosis)  Josep on cpap  Hypertriglyceridemia  Lower extremit

## 2020-12-09 NOTE — PROGRESS NOTES
[facility] BARIATRIC AND WEIGHT LOSS CLINIC  84 [facility] 91 [facility] [address]  Dept: [phone]         Patient:  Clemencia Soliz  :      10/22/1965  MRN:      [id number]    Chief Complaint:  Patient presents with:   Follow - Up: Miquel Gutierrez Transportation needs        Medical: Not on file        Non-medical: Not on file    Tobacco Use      Smoking status: Never Smoker      Smokeless tobacco: Never Used    Substance and Sexual Activity      Alcohol use: No      Drug use: Not on file      Sexua patient exercising? yes  · Type of exercise?  Walking dogs  · Goes to [employer] Habits  · Patient states the following:  · Eats 2 meal(s) per day  · Length of time it takes to consume a meal:  20  · # of snacks per day: 1 Type of snacks:    · [place] 01:03 PM    HDL 43 03/01/2019 01:03 PM    TRIG 130 03/01/2019 01:03 PM    VLDL 26 03/01/2019 01:03 PM       OBSTRUCTIVE SLEEP APNEA: The patient states her sleep apnea has been stable since the last clinic visit.  There has not been any increase in hyper-so

## 2021-02-03 ENCOUNTER — OFFICE VISIT (OUTPATIENT)
Dept: INTERNAL MEDICINE CLINIC | Facility: CLINIC | Age: 56
End: 2021-02-03
Payer: COMMERCIAL

## 2021-02-03 VITALS
DIASTOLIC BLOOD PRESSURE: 81 MMHG | BODY MASS INDEX: 53.92 KG/M2 | OXYGEN SATURATION: 96 % | WEIGHT: 293 LBS | HEIGHT: 62 IN | HEART RATE: 92 BPM | SYSTOLIC BLOOD PRESSURE: 132 MMHG

## 2021-02-03 DIAGNOSIS — Z23 NEEDS FLU SHOT: ICD-10-CM

## 2021-02-03 DIAGNOSIS — R73.01 ELEVATED FASTING GLUCOSE: ICD-10-CM

## 2021-02-03 DIAGNOSIS — Z12.31 ENCOUNTER FOR SCREENING MAMMOGRAM FOR MALIGNANT NEOPLASM OF BREAST: ICD-10-CM

## 2021-02-03 DIAGNOSIS — Z00.00 PHYSICAL EXAM: Primary | ICD-10-CM

## 2021-02-03 DIAGNOSIS — E66.01 MORBID OBESITY WITH BMI OF 50.0-59.9, ADULT (HCC): ICD-10-CM

## 2021-02-03 DIAGNOSIS — Z12.11 SCREENING FOR COLON CANCER: ICD-10-CM

## 2021-02-03 DIAGNOSIS — N95.0 POSTMENOPAUSAL BLEEDING: ICD-10-CM

## 2021-02-03 LAB
ALBUMIN SERPL-MCNC: 3.3 G/DL (ref 3.4–5)
ALBUMIN/GLOB SERPL: 0.8 {RATIO} (ref 1–2)
ALP LIVER SERPL-CCNC: 63 U/L
ALT SERPL-CCNC: 29 U/L
ANION GAP SERPL CALC-SCNC: 6 MMOL/L (ref 0–18)
AST SERPL-CCNC: 21 U/L (ref 15–37)
BASOPHILS # BLD AUTO: 0.03 X10(3) UL (ref 0–0.2)
BASOPHILS NFR BLD AUTO: 0.5 %
BILIRUB SERPL-MCNC: 0.6 MG/DL (ref 0.1–2)
BUN BLD-MCNC: 14 MG/DL (ref 7–18)
BUN/CREAT SERPL: 13.7 (ref 10–20)
CALCIUM BLD-MCNC: 8.7 MG/DL (ref 8.5–10.1)
CHLORIDE SERPL-SCNC: 108 MMOL/L (ref 98–112)
CHOLEST SMN-MCNC: 165 MG/DL (ref ?–200)
CO2 SERPL-SCNC: 28 MMOL/L (ref 21–32)
CREAT BLD-MCNC: 1.02 MG/DL
DEPRECATED RDW RBC AUTO: 45.6 FL (ref 35.1–46.3)
EOSINOPHIL # BLD AUTO: 0.01 X10(3) UL (ref 0–0.7)
EOSINOPHIL NFR BLD AUTO: 0.2 %
ERYTHROCYTE [DISTWIDTH] IN BLOOD BY AUTOMATED COUNT: 13.5 % (ref 11–15)
GLOBULIN PLAS-MCNC: 4.2 G/DL (ref 2.8–4.4)
GLUCOSE BLD-MCNC: 111 MG/DL (ref 70–99)
HCT VFR BLD AUTO: 46.6 %
HDLC SERPL-MCNC: 51 MG/DL (ref 40–59)
HGB BLD-MCNC: 14.7 G/DL
IMM GRANULOCYTES # BLD AUTO: 0 X10(3) UL (ref 0–1)
IMM GRANULOCYTES NFR BLD: 0 %
LDLC SERPL CALC-MCNC: 85 MG/DL (ref ?–100)
LYMPHOCYTES # BLD AUTO: 1.81 X10(3) UL (ref 1–4)
LYMPHOCYTES NFR BLD AUTO: 31.8 %
M PROTEIN MFR SERPL ELPH: 7.5 G/DL (ref 6.4–8.2)
MCH RBC QN AUTO: 28.7 PG (ref 26–34)
MCHC RBC AUTO-ENTMCNC: 31.5 G/DL (ref 31–37)
MCV RBC AUTO: 90.8 FL
MONOCYTES # BLD AUTO: 0.45 X10(3) UL (ref 0.1–1)
MONOCYTES NFR BLD AUTO: 7.9 %
NEUTROPHILS # BLD AUTO: 3.39 X10 (3) UL (ref 1.5–7.7)
NEUTROPHILS # BLD AUTO: 3.39 X10(3) UL (ref 1.5–7.7)
NEUTROPHILS NFR BLD AUTO: 59.6 %
NONHDLC SERPL-MCNC: 114 MG/DL (ref ?–130)
OSMOLALITY SERPL CALC.SUM OF ELEC: 295 MOSM/KG (ref 275–295)
PATIENT FASTING Y/N/NP: YES
PATIENT FASTING Y/N/NP: YES
PLATELET # BLD AUTO: 275 10(3)UL (ref 150–450)
POTASSIUM SERPL-SCNC: 4 MMOL/L (ref 3.5–5.1)
RBC # BLD AUTO: 5.13 X10(6)UL
SODIUM SERPL-SCNC: 142 MMOL/L (ref 136–145)
TRIGL SERPL-MCNC: 146 MG/DL (ref 30–149)
TSI SER-ACNC: 1.47 MIU/ML (ref 0.36–3.74)
VLDLC SERPL CALC-MCNC: 29 MG/DL (ref 0–30)
WBC # BLD AUTO: 5.7 X10(3) UL (ref 4–11)

## 2021-02-03 PROCEDURE — 80050 GENERAL HEALTH PANEL: CPT | Performed by: FAMILY MEDICINE

## 2021-02-03 PROCEDURE — 3008F BODY MASS INDEX DOCD: CPT | Performed by: FAMILY MEDICINE

## 2021-02-03 PROCEDURE — 90686 IIV4 VACC NO PRSV 0.5 ML IM: CPT | Performed by: FAMILY MEDICINE

## 2021-02-03 PROCEDURE — 90471 IMMUNIZATION ADMIN: CPT | Performed by: FAMILY MEDICINE

## 2021-02-03 PROCEDURE — 83036 HEMOGLOBIN GLYCOSYLATED A1C: CPT | Performed by: FAMILY MEDICINE

## 2021-02-03 PROCEDURE — 3079F DIAST BP 80-89 MM HG: CPT | Performed by: FAMILY MEDICINE

## 2021-02-03 PROCEDURE — 3075F SYST BP GE 130 - 139MM HG: CPT | Performed by: FAMILY MEDICINE

## 2021-02-03 PROCEDURE — 82306 VITAMIN D 25 HYDROXY: CPT | Performed by: FAMILY MEDICINE

## 2021-02-03 PROCEDURE — 36415 COLL VENOUS BLD VENIPUNCTURE: CPT | Performed by: FAMILY MEDICINE

## 2021-02-03 PROCEDURE — 80061 LIPID PANEL: CPT | Performed by: FAMILY MEDICINE

## 2021-02-03 PROCEDURE — 99386 PREV VISIT NEW AGE 40-64: CPT | Performed by: FAMILY MEDICINE

## 2021-02-03 NOTE — PROGRESS NOTES
HPI:   Selvin Willis is a 63year old female who presents for a complete physical exam.  New pt  Seeing Dr Alondra Duncan for weight loss     Last mammo: only one in life so far  Last pap:  Not in a few years  Menses: For 1.5 years had no period.  1.5 year late capsule 1      Past Medical History:   Diagnosis Date   • Asthma    • Binge eating disorder    • Morbid obesity with BMI of 50.0-59.9, adult (HCC)    • DANIEL (obstructive sleep apnea)    • Vitamin D deficiency       Past Surgical History:   Procedure Lateral for screening mammogram for malignant neoplasm of breast  Screening for colon cancer  Postmenopausal bleeding  Morbid obesity with bmi of 50.0-59.9, adult (hcc)  Needs flu shot  · Discussed with patient pap smear guidelines and the importance of screening info on weight loss recommendations. F/u with Dr Alondra Duncan  Needs to increase activity level     6.  Needs flu shot    - FLULAVAL INFLUENZA VACCINE QUAD PRESERVATIVE FREE 0.5 ML      Orders Placed This Encounter      CBC With Differential With Platelet

## 2021-02-04 PROBLEM — R73.03 PREDIABETES: Status: ACTIVE | Noted: 2021-02-04

## 2021-02-04 LAB
EST. AVERAGE GLUCOSE BLD GHB EST-MCNC: 134 MG/DL (ref 68–126)
HBA1C MFR BLD HPLC: 6.3 % (ref ?–5.7)

## 2021-02-05 LAB — 25(OH)D3 SERPL-MCNC: 10.2 NG/ML (ref 30–100)

## 2021-02-05 NOTE — PROGRESS NOTES
Roe Yarbrough,      Your blood work shows: That you have prediabetes with an A1c of 6.3. This number has been increasing over the past few years and is getting closer to becoming diabetic ( 6.5 is diabetic).   I recommend starting a medication called Metfor

## 2021-02-08 NOTE — PROGRESS NOTES
Also left VM for pt asking her to please check her my chart messages and let me know if willing to start medication

## 2021-02-10 ENCOUNTER — TELEPHONE (OUTPATIENT)
Dept: INTERNAL MEDICINE CLINIC | Facility: CLINIC | Age: 56
End: 2021-02-10

## 2021-02-10 DIAGNOSIS — E66.01 MORBID OBESITY WITH BMI OF 50.0-59.9, ADULT (HCC): Primary | ICD-10-CM

## 2021-03-12 DIAGNOSIS — Z23 NEED FOR VACCINATION: ICD-10-CM

## 2021-03-16 ENCOUNTER — IMMUNIZATION (OUTPATIENT)
Dept: LAB | Age: 56
End: 2021-03-16
Attending: HOSPITALIST
Payer: COMMERCIAL

## 2021-03-16 DIAGNOSIS — Z23 NEED FOR VACCINATION: Primary | ICD-10-CM

## 2021-03-16 PROCEDURE — 0001A SARSCOV2 VAC 30MCG/0.3ML IM: CPT

## 2021-04-06 ENCOUNTER — IMMUNIZATION (OUTPATIENT)
Dept: LAB | Age: 56
End: 2021-04-06
Attending: HOSPITALIST
Payer: COMMERCIAL

## 2021-04-06 DIAGNOSIS — Z23 NEED FOR VACCINATION: Primary | ICD-10-CM

## 2021-04-06 PROCEDURE — 0002A SARSCOV2 VAC 30MCG/0.3ML IM: CPT

## 2021-04-30 NOTE — PROGRESS NOTES
300 [facility]  300 [facility] BARIATRIC AND WEIGHT LOSS CLINIC  [address] [place] [address]  Dept: [phone]         Patient:  Siva Wasserman  :      10/22/1965  MRN:      [id number]    Chief Complaint:  Patient presents with:   Follow - Up Used    Substance and Sexual Activity      Alcohol use: No      Drug use: Not on file      Sexual activity: Not on file    Other Topics      Concerns:         Service: Not Asked        Blood Transfusions: Not Asked        Caffeine Concern:  No [facility]  · Reviewed and Discussed:       · Patient has a Food Journal?: yes   · Patient is reading nutrition labels? yes  · Average Caloric Intake:     · Average CHO Intake: 110  · Is patient exercising? yes  · Type of exercise?  Walking HYPERCHOLESTEROLEMIA:  The patient states that her cholesterol has been well controlled on her current medication.     Lab Results   Component Value Date/Time    CHOLEST 165 02/03/2021 02:47 PM    LDL 85 02/03/2021 02:47 PM    HDL 51 02/03/2021 02:47 PM

## 2021-07-19 DIAGNOSIS — E55.9 VITAMIN D DEFICIENCY: ICD-10-CM

## 2021-07-19 DIAGNOSIS — E66.01 MORBID OBESITY WITH BMI OF 50.0-59.9, ADULT (HCC): ICD-10-CM

## 2021-07-20 RX ORDER — ERGOCALCIFEROL 1.25 MG/1
CAPSULE ORAL
Qty: 12 CAPSULE | Refills: 0 | Status: SHIPPED | OUTPATIENT
Start: 2021-07-20 | End: 2021-10-22 | Stop reason: ALTCHOICE

## 2021-07-21 NOTE — TELEPHONE ENCOUNTER
Overdue for f/u with me .  Please call to sche  Next available fine or can see other provider in our office
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
## 2021-08-20 ENCOUNTER — TELEPHONE (OUTPATIENT)
Dept: PULMONOLOGY | Facility: CLINIC | Age: 56
End: 2021-08-20

## 2021-10-05 ENCOUNTER — IMMUNIZATION (OUTPATIENT)
Dept: LAB | Facility: HOSPITAL | Age: 56
End: 2021-10-05
Attending: EMERGENCY MEDICINE
Payer: COMMERCIAL

## 2021-10-05 DIAGNOSIS — Z23 NEED FOR VACCINATION: Primary | ICD-10-CM

## 2021-10-05 PROCEDURE — 0003A SARSCOV2 VAC 30MCG/0.3ML IM: CPT

## 2021-10-14 ENCOUNTER — OFFICE VISIT (OUTPATIENT)
Dept: INTERNAL MEDICINE CLINIC | Facility: CLINIC | Age: 56
End: 2021-10-14
Payer: COMMERCIAL

## 2021-10-14 VITALS
TEMPERATURE: 98 F | OXYGEN SATURATION: 97 % | DIASTOLIC BLOOD PRESSURE: 68 MMHG | HEART RATE: 91 BPM | SYSTOLIC BLOOD PRESSURE: 120 MMHG | WEIGHT: 293 LBS | HEIGHT: 62 IN | BODY MASS INDEX: 53.92 KG/M2

## 2021-10-14 DIAGNOSIS — Z12.31 ENCOUNTER FOR SCREENING MAMMOGRAM FOR MALIGNANT NEOPLASM OF BREAST: ICD-10-CM

## 2021-10-14 DIAGNOSIS — Z23 NEEDS FLU SHOT: ICD-10-CM

## 2021-10-14 DIAGNOSIS — C54.1 ENDOMETRIAL CANCER (HCC): ICD-10-CM

## 2021-10-14 DIAGNOSIS — E66.01 MORBID OBESITY WITH BMI OF 50.0-59.9, ADULT (HCC): Primary | ICD-10-CM

## 2021-10-14 DIAGNOSIS — R73.03 PREDIABETES: ICD-10-CM

## 2021-10-14 DIAGNOSIS — E55.9 VITAMIN D DEFICIENCY: ICD-10-CM

## 2021-10-14 DIAGNOSIS — Z12.11 SCREENING FOR COLON CANCER: ICD-10-CM

## 2021-10-14 PROCEDURE — 3074F SYST BP LT 130 MM HG: CPT | Performed by: FAMILY MEDICINE

## 2021-10-14 PROCEDURE — 83036 HEMOGLOBIN GLYCOSYLATED A1C: CPT | Performed by: FAMILY MEDICINE

## 2021-10-14 PROCEDURE — 90686 IIV4 VACC NO PRSV 0.5 ML IM: CPT | Performed by: FAMILY MEDICINE

## 2021-10-14 PROCEDURE — 36415 COLL VENOUS BLD VENIPUNCTURE: CPT | Performed by: FAMILY MEDICINE

## 2021-10-14 PROCEDURE — 82306 VITAMIN D 25 HYDROXY: CPT | Performed by: FAMILY MEDICINE

## 2021-10-14 PROCEDURE — 3078F DIAST BP <80 MM HG: CPT | Performed by: FAMILY MEDICINE

## 2021-10-14 PROCEDURE — 90471 IMMUNIZATION ADMIN: CPT | Performed by: FAMILY MEDICINE

## 2021-10-14 PROCEDURE — 3008F BODY MASS INDEX DOCD: CPT | Performed by: FAMILY MEDICINE

## 2021-10-14 PROCEDURE — 99214 OFFICE O/P EST MOD 30 MIN: CPT | Performed by: FAMILY MEDICINE

## 2021-10-14 NOTE — PROGRESS NOTES
Endy Montgomery is a 63year old female. CC:  Patient presents with:   Follow - Up: Pt presents for DM F/U      HPI:    F/u prediabetes  Just started MTF in mid July b/c of cancer treatment prior to that  On 500 twice a day  Has made her stools more Family History   Problem Relation Age of Onset   • Pulmonary Disease Other         Emphysema  Family H/O   • Thyroid Disorder Mother         Hypothyroid   • High Blood Pressure Mother    • DCIS Mother    • Thyroid Disorder Sister         Hypothryroid metFORMIN 500 MG Oral Tab; Take 2 tablets (1,000 mg total) by mouth 2 (two) times daily with meals. Dispense: 360 tablet; Refill: 0  - HEMOGLOBIN A1C    3. Endometrial cancer ([address]  Done with treatment. We will continue follow-up with oncology at [facility].

## 2021-10-22 ENCOUNTER — OFFICE VISIT (OUTPATIENT)
Dept: SURGERY | Facility: CLINIC | Age: 56
End: 2021-10-22
Payer: COMMERCIAL

## 2021-10-22 VITALS
WEIGHT: 293 LBS | OXYGEN SATURATION: 98 % | HEIGHT: 62 IN | SYSTOLIC BLOOD PRESSURE: 122 MMHG | BODY MASS INDEX: 53.92 KG/M2 | DIASTOLIC BLOOD PRESSURE: 78 MMHG | HEART RATE: 95 BPM

## 2021-10-22 DIAGNOSIS — Z99.89 OSA ON CPAP: ICD-10-CM

## 2021-10-22 DIAGNOSIS — Z51.81 ENCOUNTER FOR THERAPEUTIC DRUG MONITORING: ICD-10-CM

## 2021-10-22 DIAGNOSIS — G47.33 OSA ON CPAP: ICD-10-CM

## 2021-10-22 DIAGNOSIS — E66.01 MORBID OBESITY WITH BMI OF 50.0-59.9, ADULT (HCC): ICD-10-CM

## 2021-10-22 DIAGNOSIS — F50.81 BINGE EATING DISORDER: ICD-10-CM

## 2021-10-22 DIAGNOSIS — E78.1 HYPERTRIGLYCERIDEMIA: Primary | ICD-10-CM

## 2021-10-22 PROCEDURE — 3008F BODY MASS INDEX DOCD: CPT | Performed by: INTERNAL MEDICINE

## 2021-10-22 PROCEDURE — 3074F SYST BP LT 130 MM HG: CPT | Performed by: INTERNAL MEDICINE

## 2021-10-22 PROCEDURE — 3078F DIAST BP <80 MM HG: CPT | Performed by: INTERNAL MEDICINE

## 2021-10-22 PROCEDURE — 99214 OFFICE O/P EST MOD 30 MIN: CPT | Performed by: INTERNAL MEDICINE

## 2021-10-22 NOTE — PROGRESS NOTES
[facility]  [facility] BARIATRIC AND WEIGHT LOSS CLINIC  [address] [place] [id number]  Dept: [phone]         Patient:  Jayna Oneal  :      10/22/1965  MRN:      [id number]    Chief Complaint:  Patient presents with:   Follow - Up file      Sexual activity: Not on file    Other Topics      Concerns:         Service: Not Asked        Blood Transfusions: Not Asked        Caffeine Concern: No        Occupational Exposure: Not Asked        Hobby Hazards: Not Asked        Sleep [place]     Family History:    Family History   Problem Relation Age of Onset   • Pulmonary Disease Other         Emphysema  Family H/O   • Thyroid Disorder Mother         Hypothyroid   • High Blood Pressure Mother    • DCIS Mother    • [facility] Disorder [facility] obvious abnormality, atraumatic  Lungs: clear to auscultation bilaterally  Heart: S1, S2 normal, no murmur, click, rub or gallop, regular rate and rhythm  Abdomen: soft, non-tender; bowel sounds normal; no masses,  no organomegaly  Pelvic: deferred  Extrem mg  Needs updated ekg    Completed radiation therapy      Recommended Zoloft for SOTERO.  Wants to hold off for now    ekg done      Duke Holter, MD

## 2021-12-03 DIAGNOSIS — R73.03 PREDIABETES: ICD-10-CM

## 2021-12-06 NOTE — TELEPHONE ENCOUNTER
Requested Prescriptions     Pending Prescriptions Disp Refills   • METFORMIN 500 MG Oral Tab [Pharmacy Med Name: METFORMIN 500MG TABLETS] 180 tablet 0     Sig: TAKE 1 TABLET(500 MG) BY MOUTH TWICE DAILY WITH MEALS     Last office visit: 10-14-21  Medicatio

## 2022-01-06 DIAGNOSIS — R73.03 PREDIABETES: ICD-10-CM

## 2022-01-28 ENCOUNTER — OFFICE VISIT (OUTPATIENT)
Dept: SURGERY | Facility: CLINIC | Age: 57
End: 2022-01-28
Payer: COMMERCIAL

## 2022-01-28 VITALS
HEIGHT: 62 IN | BODY MASS INDEX: 53.92 KG/M2 | SYSTOLIC BLOOD PRESSURE: 124 MMHG | OXYGEN SATURATION: 96 % | HEART RATE: 93 BPM | WEIGHT: 293 LBS | DIASTOLIC BLOOD PRESSURE: 75 MMHG

## 2022-01-28 DIAGNOSIS — E78.1 HYPERTRIGLYCERIDEMIA: Primary | ICD-10-CM

## 2022-01-28 DIAGNOSIS — E88.81 INSULIN RESISTANCE: ICD-10-CM

## 2022-01-28 DIAGNOSIS — G47.33 OSA ON CPAP: ICD-10-CM

## 2022-01-28 DIAGNOSIS — Z51.81 ENCOUNTER FOR THERAPEUTIC DRUG MONITORING: ICD-10-CM

## 2022-01-28 DIAGNOSIS — F50.81 BINGE EATING DISORDER: ICD-10-CM

## 2022-01-28 DIAGNOSIS — Z99.89 OSA ON CPAP: ICD-10-CM

## 2022-01-28 DIAGNOSIS — E66.01 MORBID OBESITY WITH BMI OF 50.0-59.9, ADULT (HCC): ICD-10-CM

## 2022-01-28 PROBLEM — E88.819 INSULIN RESISTANCE: Status: ACTIVE | Noted: 2022-01-28

## 2022-01-28 PROCEDURE — 99214 OFFICE O/P EST MOD 30 MIN: CPT | Performed by: INTERNAL MEDICINE

## 2022-01-28 PROCEDURE — 3008F BODY MASS INDEX DOCD: CPT | Performed by: INTERNAL MEDICINE

## 2022-01-28 PROCEDURE — 3078F DIAST BP <80 MM HG: CPT | Performed by: INTERNAL MEDICINE

## 2022-01-28 PROCEDURE — 3074F SYST BP LT 130 MM HG: CPT | Performed by: INTERNAL MEDICINE

## 2022-01-28 RX ORDER — DIETHYLPROPION HYDROCHLORIDE 75 MG/1
1 TABLET ORAL DAILY
Qty: 30 TABLET | Refills: 2 | Status: SHIPPED | OUTPATIENT
Start: 2022-01-28 | End: 2022-02-27

## 2022-01-28 NOTE — PROGRESS NOTES
[address] [place] [address] BARIATRIC AND WEIGHT LOSS CLINIC  [address] [place] [id number]  Dept: [phone]         Patient:  Kentrell Hawk  :      10/22/1965  MRN:      [id number]    Chief Complaint:  Patient presents with:   Follow - Up activity: Not on file    Other Topics      Concerns:         Service: Not Asked        Blood Transfusions: Not Asked        Caffeine Concern: No        Occupational Exposure: Not Asked        Hobby Hazards: Not Asked        Sleep Concern: Not Asked 100-200 calories within 1 hour of waking  and Eat 3-4 cups of fresh fruits or vegetables daily    Behavior Modifications Reviewed and Discussed  Eat breakfast, Eat 3 meals per day, Plan meals in advance, Read nutrition labels, Drink 64 oz of water per day, resistance    PLAN   No orders of the defined types were placed in this encounter. Patient is not interested in bariatric surgery. Patient desires to pursue traditional weight loss at this time.       DYSLIPIDEMIA: Stable on the above prescribed meal tolu

## 2022-03-24 ENCOUNTER — OFFICE VISIT (OUTPATIENT)
Dept: INTERNAL MEDICINE CLINIC | Facility: CLINIC | Age: 57
End: 2022-03-24
Payer: COMMERCIAL

## 2022-03-24 VITALS
DIASTOLIC BLOOD PRESSURE: 74 MMHG | OXYGEN SATURATION: 98 % | BODY MASS INDEX: 56 KG/M2 | SYSTOLIC BLOOD PRESSURE: 128 MMHG | WEIGHT: 293 LBS | HEART RATE: 96 BPM

## 2022-03-24 DIAGNOSIS — Z00.00 PHYSICAL EXAM: Primary | ICD-10-CM

## 2022-03-24 DIAGNOSIS — C54.1 ENDOMETRIAL CANCER (HCC): ICD-10-CM

## 2022-03-24 DIAGNOSIS — R73.03 PREDIABETES: ICD-10-CM

## 2022-03-24 DIAGNOSIS — E66.01 MORBID OBESITY WITH BMI OF 50.0-59.9, ADULT (HCC): ICD-10-CM

## 2022-03-24 DIAGNOSIS — Z12.11 COLON CANCER SCREENING: ICD-10-CM

## 2022-03-24 DIAGNOSIS — Z12.31 ENCOUNTER FOR SCREENING MAMMOGRAM FOR MALIGNANT NEOPLASM OF BREAST: ICD-10-CM

## 2022-03-24 LAB
ALBUMIN SERPL-MCNC: 3.6 G/DL (ref 3.4–5)
ALBUMIN/GLOB SERPL: 0.9 {RATIO} (ref 1–2)
ALT SERPL-CCNC: 39 U/L
ANION GAP SERPL CALC-SCNC: 7 MMOL/L (ref 0–18)
AST SERPL-CCNC: 28 U/L (ref 15–37)
BASOPHILS # BLD AUTO: 0.02 X10(3) UL (ref 0–0.2)
BASOPHILS NFR BLD AUTO: 0.6 %
BILIRUB SERPL-MCNC: 0.7 MG/DL (ref 0.1–2)
BUN BLD-MCNC: 13 MG/DL (ref 7–18)
BUN/CREAT SERPL: 12 (ref 10–20)
CALCIUM BLD-MCNC: 9.1 MG/DL (ref 8.5–10.1)
CHLORIDE SERPL-SCNC: 103 MMOL/L (ref 98–112)
CHOLEST SERPL-MCNC: 155 MG/DL (ref ?–200)
CO2 SERPL-SCNC: 29 MMOL/L (ref 21–32)
CREAT BLD-MCNC: 1.08 MG/DL
DEPRECATED RDW RBC AUTO: 48.1 FL (ref 35.1–46.3)
EOSINOPHIL # BLD AUTO: 0.08 X10(3) UL (ref 0–0.7)
EOSINOPHIL NFR BLD AUTO: 2.3 %
ERYTHROCYTE [DISTWIDTH] IN BLOOD BY AUTOMATED COUNT: 14 % (ref 11–15)
EST. AVERAGE GLUCOSE BLD GHB EST-MCNC: 128 MG/DL (ref 68–126)
FASTING PATIENT LIPID ANSWER: YES
FASTING STATUS PATIENT QL REPORTED: YES
HBA1C MFR BLD: 6.1 % (ref ?–5.7)
HCT VFR BLD AUTO: 47.6 %
HDLC SERPL-MCNC: 43 MG/DL (ref 40–59)
HGB BLD-MCNC: 14.8 G/DL
IMM GRANULOCYTES # BLD AUTO: 0.01 X10(3) UL (ref 0–1)
IMM GRANULOCYTES NFR BLD: 0.3 %
LDLC SERPL CALC-MCNC: 88 MG/DL (ref ?–100)
LYMPHOCYTES # BLD AUTO: 0.56 X10(3) UL (ref 1–4)
LYMPHOCYTES NFR BLD AUTO: 16.4 %
MCH RBC QN AUTO: 29 PG (ref 26–34)
MCHC RBC AUTO-ENTMCNC: 31.1 G/DL (ref 31–37)
MCV RBC AUTO: 93.2 FL
MONOCYTES # BLD AUTO: 0.38 X10(3) UL (ref 0.1–1)
MONOCYTES NFR BLD AUTO: 11.1 %
NEUTROPHILS # BLD AUTO: 2.37 X10 (3) UL (ref 1.5–7.7)
NEUTROPHILS # BLD AUTO: 2.37 X10(3) UL (ref 1.5–7.7)
NEUTROPHILS NFR BLD AUTO: 69.3 %
NONHDLC SERPL-MCNC: 112 MG/DL (ref ?–130)
OSMOLALITY SERPL CALC.SUM OF ELEC: 289 MOSM/KG (ref 275–295)
PLATELET # BLD AUTO: 248 10(3)UL (ref 150–450)
POTASSIUM SERPL-SCNC: 4.1 MMOL/L (ref 3.5–5.1)
PROT SERPL-MCNC: 7.5 G/DL (ref 6.4–8.2)
RBC # BLD AUTO: 5.11 X10(6)UL
SODIUM SERPL-SCNC: 139 MMOL/L (ref 136–145)
TRIGL SERPL-MCNC: 138 MG/DL (ref 30–149)
VLDLC SERPL CALC-MCNC: 22 MG/DL (ref 0–30)
WBC # BLD AUTO: 3.4 X10(3) UL (ref 4–11)

## 2022-03-24 PROCEDURE — 85025 COMPLETE CBC W/AUTO DIFF WBC: CPT | Performed by: FAMILY MEDICINE

## 2022-03-24 PROCEDURE — 99396 PREV VISIT EST AGE 40-64: CPT | Performed by: FAMILY MEDICINE

## 2022-03-24 PROCEDURE — 80053 COMPREHEN METABOLIC PANEL: CPT | Performed by: FAMILY MEDICINE

## 2022-03-24 PROCEDURE — 3078F DIAST BP <80 MM HG: CPT | Performed by: FAMILY MEDICINE

## 2022-03-24 PROCEDURE — 83036 HEMOGLOBIN GLYCOSYLATED A1C: CPT | Performed by: FAMILY MEDICINE

## 2022-03-24 PROCEDURE — 3074F SYST BP LT 130 MM HG: CPT | Performed by: FAMILY MEDICINE

## 2022-03-24 PROCEDURE — 80061 LIPID PANEL: CPT | Performed by: FAMILY MEDICINE

## 2022-04-29 ENCOUNTER — LAB ENCOUNTER (OUTPATIENT)
Dept: LAB | Facility: HOSPITAL | Age: 57
End: 2022-04-29
Attending: INTERNAL MEDICINE
Payer: COMMERCIAL

## 2022-04-29 ENCOUNTER — OFFICE VISIT (OUTPATIENT)
Dept: SURGERY | Facility: CLINIC | Age: 57
End: 2022-04-29
Payer: COMMERCIAL

## 2022-04-29 VITALS
WEIGHT: 293 LBS | SYSTOLIC BLOOD PRESSURE: 109 MMHG | HEIGHT: 62 IN | HEART RATE: 70 BPM | BODY MASS INDEX: 53.92 KG/M2 | OXYGEN SATURATION: 96 % | DIASTOLIC BLOOD PRESSURE: 70 MMHG

## 2022-04-29 DIAGNOSIS — E66.01 MORBID OBESITY WITH BMI OF 50.0-59.9, ADULT (HCC): ICD-10-CM

## 2022-04-29 DIAGNOSIS — F50.81 BINGE EATING DISORDER: ICD-10-CM

## 2022-04-29 DIAGNOSIS — E78.1 HYPERTRIGLYCERIDEMIA: Primary | ICD-10-CM

## 2022-04-29 DIAGNOSIS — Z99.89 OSA ON CPAP: ICD-10-CM

## 2022-04-29 DIAGNOSIS — Z51.81 ENCOUNTER FOR THERAPEUTIC DRUG MONITORING: ICD-10-CM

## 2022-04-29 DIAGNOSIS — G47.33 OSA ON CPAP: ICD-10-CM

## 2022-04-29 DIAGNOSIS — E78.1 HYPERTRIGLYCERIDEMIA: ICD-10-CM

## 2022-04-29 PROCEDURE — 3078F DIAST BP <80 MM HG: CPT | Performed by: INTERNAL MEDICINE

## 2022-04-29 PROCEDURE — 3074F SYST BP LT 130 MM HG: CPT | Performed by: INTERNAL MEDICINE

## 2022-04-29 PROCEDURE — 99214 OFFICE O/P EST MOD 30 MIN: CPT | Performed by: INTERNAL MEDICINE

## 2022-04-29 PROCEDURE — 93010 ELECTROCARDIOGRAM REPORT: CPT | Performed by: INTERNAL MEDICINE

## 2022-04-29 PROCEDURE — 93005 ELECTROCARDIOGRAM TRACING: CPT

## 2022-04-29 PROCEDURE — 3008F BODY MASS INDEX DOCD: CPT | Performed by: INTERNAL MEDICINE

## 2022-04-29 RX ORDER — DIETHYLPROPION HYDROCHLORIDE 25 MG/1
1 TABLET ORAL
Qty: 60 TABLET | Refills: 2 | Status: SHIPPED | OUTPATIENT
Start: 2022-04-29 | End: 2022-05-29

## 2023-09-28 ENCOUNTER — HOSPITAL ENCOUNTER (OUTPATIENT)
Age: 58
Discharge: HOME | End: 2023-09-28
Payer: COMMERCIAL

## 2023-09-28 VITALS
DIASTOLIC BLOOD PRESSURE: 82 MMHG | RESPIRATION RATE: 18 BRPM | TEMPERATURE: 99 F | HEART RATE: 87 BPM | SYSTOLIC BLOOD PRESSURE: 135 MMHG | OXYGEN SATURATION: 97 %

## 2023-09-28 DIAGNOSIS — L72.0 EPIDERMOID CYST: Primary | ICD-10-CM

## 2023-09-28 PROCEDURE — 99203 OFFICE O/P NEW LOW 30 MIN: CPT | Performed by: NURSE PRACTITIONER

## 2023-09-28 PROCEDURE — 10060 I&D ABSCESS SIMPLE/SINGLE: CPT | Performed by: NURSE PRACTITIONER

## 2023-09-28 RX ORDER — SULFAMETHOXAZOLE AND TRIMETHOPRIM 800; 160 MG/1; MG/1
1 TABLET ORAL 2 TIMES DAILY
Qty: 14 TABLET | Refills: 0 | Status: SHIPPED | OUTPATIENT
Start: 2023-09-28 | End: 2023-10-05

## 2024-04-17 NOTE — PROGRESS NOTES
HPI:   Linnea Chávez is a 58 year old female who presents for a complete physical exam and for palpitations     Last mammo: needs  Menses:    Had hysterectomy- endometrial cancer - was seeing gyn onc at [place]   Had pap 2021  Previous colonoscopy:  Never   Exercise:  mostly sedentary, Not nearly active lately b/c hasn't been working out of house consistently  Denies depression but no energy      Does use cpap machine- sees Dr Griffin    Also here for palpitations   Had episode of palpitations 3 weeks ago that was fast heart rate. Unsure how fast   Lasted almost an hour  No other symptoms  No CP or SOB or lightheaded   Hasn't happened since  Has happened in the past but never for so long and was awhile ago  No other symptoms   Some heart disease in grandparents         Has gained weight- unsure why  Hasn't changed her diet  Thinks most of weight gain is recent     Off and on lower extremity edema  Some SOB with exertion  Mostly sedentary      Hx prediabetes- could not tolerate MTF  Winter- more starchy, heavy foods  Springs- salads  Diet soda in am   Not on a schedule- eats when she wants what she wants her whole life.  Not binge eating, some stress eating    Nursing instructor- teachers online   Lives alone  No kids  3 cats        Wt Readings from Last 6 Encounters:   04/18/24 (!) 322 lb 9.6 oz (146.3 kg)   04/29/22 299 lb (135.6 kg)   03/24/22 (!) 305 lb (138.3 kg)   01/28/22 (!) 307 lb 14.4 oz (139.7 kg)   10/22/21 (!) 302 lb 12.8 oz (137.3 kg)   10/14/21 (!) 308 lb 6.4 oz (139.9 kg)     Body mass index is 59 kg/m².     Cholesterol, Total (mg/dL)   Date Value   03/24/2022 155   02/03/2021 165   03/01/2019 180     HDL Cholesterol (mg/dL)   Date Value   03/24/2022 43   02/03/2021 51   03/01/2019 43     LDL Cholesterol (mg/dL)   Date Value   03/24/2022 88   02/03/2021 85   03/01/2019 111 (H)        Current Outpatient Medications   Medication Sig Dispense Refill    METFORMIN 500 MG Oral Tab TAKE 1 TABLET(500 MG)  BY MOUTH TWICE DAILY WITH MEALS (Patient not taking: Reported on 9/28/2023) 180 tablet 0      Past Medical History:    Asthma (HCC)    Binge eating disorder    Morbid obesity with BMI of 50.0-59.9, adult (HCC)    DANIEL (obstructive sleep apnea)    Vitamin D deficiency      Past Surgical History:   Procedure Laterality Date    Other  1990    [place]astric [place]      Family History   Problem Relation Age of Onset    Pulmonary Disease Other         Emphysema  Family H/O    Thyroid Disorder Mother         Hypothyroid    High Blood Pressure Mother     DCIS Mother     Thyroid Disorder Sister         Hypothryroid    Heart Attack Paternal Uncle       Social History:   Social History     Socioeconomic History    Marital status: Single   Tobacco Use    Smoking status: Never    Smokeless tobacco: Never   Substance and Sexual Activity    Alcohol use: No   Other Topics Concern    Caffeine Concern No          REVIEW OF SYSTEMS:   GENERAL: feels well otherwise  SKIN: denies any unusual skin lesions  EYES:no vision problems  LUNGS: no cough, some LEACH , no SOB at rest   CARDIOVASCULAR: denies chest pain see hPI   GI: denies abdominal pain,  No constipation or diarrhea  See HPI for rest       EXAM:   /82   Pulse 86   Ht 5' 2\" (1.575 m)   Wt (!) 322 lb 9.6 oz (146.3 kg)   SpO2 97%   BMI 59.00 kg/m²   Body mass index is 59 kg/m².   GENERAL: well developed, well nourished,in no apparent distress  SKIN: no rashes,no suspicious lesions  HEENT: atraumatic, normocephalic,  EYES:,conjunctiva are clear  NECK: supple,no adenopathy  BREAST: no dominant or suspicious mass, no nipple discharge  LUNGS: clear to auscultation  CARDIO: RRR without murmur  GI: good BS's,no masses, HSM or tenderness   EXTREMITIES: no edema    ASSESSMENT AND PLAN:   Linnea Chávez is a 58 year old female who presents for a complete physical exam.  No diagnosis found.  Discussed with patient pap smear guidelines and the importance of screening for  cervical cancer- overdue- will see gyn given other issues  Discussed the importance of yearly mammograms starting at age 40 to help detect breast cancer.   Self breast exam explained and encouraged to perform monthly.   Health maintenance labs, recommend yearly: Lipids, CMP, and CBC.   Discussed importance of screening for colon cancer with colonoscopies starting at age 45, or sooner if high risk  Recommended low fat diet, low carb diet and regular aerobic exercise.    The patient indicates understanding of these issues and agrees to the plan.  The patient is asked to return for CPX in 1 yr.      1. Physical exam    - CBC With Differential With Platelet  - Comp Metabolic Panel (14)  - TSH W Reflex To Free T4  - Lipid Panel  - HGB A1C (Glycohemoglobin) [496] [Q]  - Zoster Recombinant Adjuvanted (Shingrix -Shingles) [61014]    2. Encounter for screening mammogram for malignant neoplasm of breast      - [facility] VANDANA 2D+3D SCREENING BILAT (CPT=77067/03650); Future    3. Palpitations    Stop caffeine  Check EKG  Holter and echo  Refer to cardiology for further eval   To ER if happens again    - EKG 12 Lead to be performed at [facility]; Future  - Cardio Referral - Internal  - CARD MONITOR HOLTER 48 HOUR (CPT=93225); Future  - CARD ECHO 2D DOPPLER (CPT=93306); Future    4. Lower extremity edema  Check echo  Limit salt  Check labs  Refer to cardiology for further eval  Elevate legs   - CARD ECHO 2D DOPPLER (CPT=93306); Future    5. Insulin resistance  Check A1c     6. Morbid obesity with BMI of 50.0-59.9, adult (HCC)  Pt counseled on importance of weight loss and exercise. Recommend 30 min of cardio activity 5 times a week. Advised small high protein meals and snacks throughout day. Avoid carbs and sugars. Increase water and not to drink liquid calories. Pt given printed info on weight loss recommendations.   Can f/u for dedicated weight visit     7. Colon cancer screening      - COLOGUARD COLON CANCER  SCREENING (EXTERNAL)    8. LEACH (dyspnea on exertion)    - CARD ECHO 2D DOPPLER (CPT=93306); Future    9. Endometrial cancer (HCC)  Needs to f/u with [facility] onc  Per notes says was supposed to follow up tl 2026 q 6 mos and she is overdue           No orders of the defined types were placed in this encounter.      Meds & Refills for this Visit:  Requested Prescriptions      No prescriptions requested or ordered in this encounter       Imaging & Consults:  None

## 2024-04-18 ENCOUNTER — EKG ENCOUNTER (OUTPATIENT)
Dept: LAB | Age: 59
End: 2024-04-18
Attending: FAMILY MEDICINE
Payer: COMMERCIAL

## 2024-04-18 ENCOUNTER — OFFICE VISIT (OUTPATIENT)
Dept: INTERNAL MEDICINE CLINIC | Facility: CLINIC | Age: 59
End: 2024-04-18
Payer: COMMERCIAL

## 2024-04-18 VITALS
WEIGHT: 293 LBS | OXYGEN SATURATION: 97 % | BODY MASS INDEX: 53.92 KG/M2 | HEIGHT: 62 IN | HEART RATE: 86 BPM | SYSTOLIC BLOOD PRESSURE: 122 MMHG | DIASTOLIC BLOOD PRESSURE: 82 MMHG

## 2024-04-18 DIAGNOSIS — Z12.11 COLON CANCER SCREENING: ICD-10-CM

## 2024-04-18 DIAGNOSIS — Z12.31 ENCOUNTER FOR SCREENING MAMMOGRAM FOR MALIGNANT NEOPLASM OF BREAST: ICD-10-CM

## 2024-04-18 DIAGNOSIS — C54.1 ENDOMETRIAL CANCER (HCC): ICD-10-CM

## 2024-04-18 DIAGNOSIS — E88.819 INSULIN RESISTANCE: ICD-10-CM

## 2024-04-18 DIAGNOSIS — R60.0 LOWER EXTREMITY EDEMA: ICD-10-CM

## 2024-04-18 DIAGNOSIS — E66.01 MORBID OBESITY WITH BMI OF 50.0-59.9, ADULT (HCC): ICD-10-CM

## 2024-04-18 DIAGNOSIS — R00.2 PALPITATIONS: ICD-10-CM

## 2024-04-18 DIAGNOSIS — Z00.00 PHYSICAL EXAM: Primary | ICD-10-CM

## 2024-04-18 DIAGNOSIS — R06.09 DOE (DYSPNEA ON EXERTION): ICD-10-CM

## 2024-04-18 LAB
ATRIAL RATE: 79 BPM
P AXIS: 50 DEGREES
P-R INTERVAL: 138 MS
Q-T INTERVAL: 394 MS
QRS DURATION: 84 MS
QTC CALCULATION (BEZET): 451 MS
R AXIS: 42 DEGREES
T AXIS: -35 DEGREES
VENTRICULAR RATE: 79 BPM

## 2024-04-18 PROCEDURE — 90471 IMMUNIZATION ADMIN: CPT | Performed by: FAMILY MEDICINE

## 2024-04-18 PROCEDURE — 90750 HZV VACC RECOMBINANT IM: CPT | Performed by: FAMILY MEDICINE

## 2024-04-18 PROCEDURE — 93010 ELECTROCARDIOGRAM REPORT: CPT | Performed by: INTERNAL MEDICINE

## 2024-04-18 PROCEDURE — 93005 ELECTROCARDIOGRAM TRACING: CPT

## 2024-04-18 PROCEDURE — 99396 PREV VISIT EST AGE 40-64: CPT | Performed by: FAMILY MEDICINE

## 2024-04-18 NOTE — PATIENT INSTRUCTIONS
Recommendations on weight loss:    - Drink 8 glasses of water a day/ 64 oz  - Do not drink your calories ( no regular pop, juice, high calorie coffee drinks, limit alcohol)  - Eat high protein meals and snacks- aim for 15-30 gm of protein / meal and chose snacks that are high in protein ( ex hard boiled eggs, string cheese, cottage cheese, greek yogurt. Natural fats  and lean meats are also a good source of protein.  Limit carbs to 100 gm/ day. When choosing carbs chose healthy carbs ( fruit/ veggies/ whole grain options/ sweet potatoes). Dietdoctor.com is good resource for this.  - Don't deprive yourself of food you like, just limit amount and make smart choices  - Write down everything you eat for a few days- right away and think about why you are eating ( are you hungry, or are you eating because you are bored, tired, etc)- to get back on track or use Lose it Vidya  - Do not eat late at night  - Exercise- aim for 30 minutes 5 times a week of cardiac activity. Also strength training 2-3 times a week  30 minutes 5 times a week is recommended for weight maintenance, but for weight loss the goal is 300 minutes per/ week   - Weight yourself once a week first thing in the morning  -start taking fiber supplement daily- ex psyllium ( ex citracel, metamucil or generic psyllium ( can get at [employer] ex)). This helps keep stools regular, helps you feel full and can be good for the heart. Also increasing fiber in your diet is helpful.   Work on stress and increased sleep        1. Cut down your sugar consumption  2. Cut down refined grains/ carbs  3. Eat a good amount of protein and natural fats ( lean meats/avocado)  4. Increase natural fiber ( fruits/veggies) 5-6 servings / day      Nutritional Goals :           Calorie-controlled diet:  approx 1500 shannen ( may be more or less depending on exercise), Limit carbohydrates to <100 gms per day, Protein goal of 100 gms per day.  Increase fiber to 25-35g     Use vidya LOSE IT or MY  FITNESS PAL to track calories  Goal to lose 1.5-2 lbs/ week     Take time to shop, read labels, plan healthy meals and snacks on the goal.  Protein shakes may help- example Premier protein or Orgain plant protein shake       Also consider weight watchers    Great resource: dietdoctor.com    Good books:   The Crockett Diet Solution ( helps with tips to stay motivated)  The Obesity Code and The Complete Guide to Intermittent Fasting - both about fasting and by Dr. Flaquito Lanza    Good recipes: [employer] blog     Podcast : strong as a working mom- Kaylynn Dutta MD. You don't need to be a working mom to find some of these tips helpful.    MY BEST ADVICE:  EAT LOW CARB AND SUGAR- looks at labels.   EAT HIGH PROTEIN TO FILL YOU UP  AND FOODS HIGH IN FIBER  EAT LESS PROCESSED FOODS/ MORE CLEAN EATING- this makes those two ideas above easier  EXERCISE FOR MOOD/ SLEEP/ENERGY / YOUR HEART/ AND HELP WITH WEIGHT LOSS . GET GOOD SLEEP.    IF YOU HAVE A BAD DAY, DON'T BEAT YOURSELF UP AND LOSE CONTROL. JUST GET BACK ON TRACK.

## 2024-04-19 DIAGNOSIS — Z00.00 PHYSICAL EXAM: Primary | ICD-10-CM

## 2024-04-19 LAB
ABSOLUTE BASOPHILS: 30 CELLS/UL (ref 0–200)
ABSOLUTE EOSINOPHILS: 194 CELLS/UL (ref 15–500)
ABSOLUTE LYMPHOCYTES: 937 CELLS/UL (ref 850–3900)
ABSOLUTE MONOCYTES: 503 CELLS/UL (ref 200–950)
ABSOLUTE NEUTROPHILS: 2636 CELLS/UL (ref 1500–7800)
ALBUMIN/GLOBULIN RATIO: 1.3 (CALC) (ref 1–2.5)
ALBUMIN: 3.9 G/DL (ref 3.6–5.1)
ALKALINE PHOSPHATASE: 52 U/L (ref 37–153)
ALT: 23 U/L (ref 6–29)
AST: 21 U/L (ref 10–35)
BASOPHILS: 0.7 %
BILIRUBIN, TOTAL: 0.5 MG/DL (ref 0.2–1.2)
BUN: 13 MG/DL (ref 7–25)
CALCIUM: 10 MG/DL (ref 8.6–10.4)
CARBON DIOXIDE: 25 MMOL/L (ref 20–32)
CHLORIDE: 106 MMOL/L (ref 98–110)
CHOL/HDLC RATIO: 3.2 (CALC)
CHOLESTEROL, TOTAL: 166 MG/DL
CREATININE: 0.82 MG/DL (ref 0.5–1.03)
EGFR: 82 ML/MIN/1.73M2
EOSINOPHILS: 4.5 %
GLOBULIN: 3.1 G/DL (CALC) (ref 1.9–3.7)
GLUCOSE: 136 MG/DL (ref 65–99)
HDL CHOLESTEROL: 52 MG/DL
HEMATOCRIT: 45 % (ref 35–45)
HEMOGLOBIN A1C: 6.8 % OF TOTAL HGB
HEMOGLOBIN: 14.6 G/DL (ref 11.7–15.5)
LDL-CHOLESTEROL: 92 MG/DL (CALC)
LYMPHOCYTES: 21.8 %
MCH: 28.7 PG (ref 27–33)
MCHC: 32.4 G/DL (ref 32–36)
MCV: 88.6 FL (ref 80–100)
MONOCYTES: 11.7 %
MPV: 10.3 FL (ref 7.5–12.5)
NEUTROPHILS: 61.3 %
NON-HDL CHOLESTEROL: 114 MG/DL (CALC)
PLATELET COUNT: 230 THOUSAND/UL (ref 140–400)
POTASSIUM: 4.6 MMOL/L (ref 3.5–5.3)
PROTEIN, TOTAL: 7 G/DL (ref 6.1–8.1)
RDW: 13.4 % (ref 11–15)
RED BLOOD CELL COUNT: 5.08 MILLION/UL (ref 3.8–5.1)
SODIUM: 142 MMOL/L (ref 135–146)
TRIGLYCERIDES: 123 MG/DL
TSH W/REFLEX TO FT4: 2.41 MIU/L (ref 0.4–4.5)
WHITE BLOOD CELL COUNT: 4.3 THOUSAND/UL (ref 3.8–10.8)

## 2024-05-01 NOTE — PROGRESS NOTES
CC:  Lab Results (F/u on lab results)      Hx of CC:    New diagnosis of diabetes, A1c 6.8  Is a RN    Struggles with weight  Lack of energy  Eats whatever and whenever she wants. Lives alone  Not motivated and out of shape  Denies binging  Eats more at night  Insomnia for years  Hungry but also eats when bored  Can't fall asleep but then sleeps til 2 pm  Only gets a few hours of sleep a night   Uses CPAP  Always tired  Using light therapy which helps   Wants To find program where she can go in person and be held accountable  Tried metformin regular before and had side effect of diarrhea.    Denies depression    No further palpitations  Will see cardiology    Personal or family history of thyroid cancer          Allergies:  Allergies   Allergen Reactions    Melatonin SWELLING     Swelling of her eyes and face     Erythromycin      Other reaction(s): Unknown    Penicillins      Other reaction(s): Urticaria    Amoxicillin-Pot Clavulanate RASH      Current Meds:  Current Outpatient Medications   Medication Sig Dispense Refill    semaglutide (OZEMPIC, 0.25 OR 0.5 MG/DOSE,) 2 MG/3ML Subcutaneous Solution Pen-injector 0.25 mg weekly for 4 weeks, then 0.5 mg weekly 1 each 1    metFORMIN  MG Oral Tablet 24 Hr Take 1 tablet (500 mg total) by mouth daily. 90 tablet 0    METFORMIN 500 MG Oral Tab TAKE 1 TABLET(500 MG) BY MOUTH TWICE DAILY WITH MEALS (Patient not taking: Reported on 9/28/2023) 180 tablet 0        History:  Past Medical History:    Asthma (HCC)    Binge eating disorder    Morbid obesity with BMI of 50.0-59.9, adult (HCC)    DANIEL (obstructive sleep apnea)    Vitamin D deficiency      Past Surgical History:   Procedure Laterality Date    Other  1990    [place]astric [place]      Family History   Problem Relation Age of Onset    Pulmonary Disease Other         Emphysema  Family H/O    Thyroid Disorder Mother         Hypothyroid    High Blood Pressure Mother     DCIS Mother     Thyroid Disorder Sister          Hypothryroid    Heart Attack Paternal Uncle       Family Status   Relation Status    Other (Not Specified)    Mo (Not Specified)    Sis (Not Specified)    PGFA Other    Paternal Unc       Social History     Socioeconomic History    Marital status: Single   Tobacco Use    Smoking status: Never    Smokeless tobacco: Never   Substance and Sexual Activity    Alcohol use: No   Other Topics Concern    Caffeine Concern No            ROS:  General:  No fever, no fatigue, no weight changes  HEENT:  Denies congestion or nasal discharge  Cardio:  No chest pain   Pulmonary:  No cough, no SOB, stools run lose  GI:  No N/V/D  Dermatologic:  No rashes    Physical:    /72   Pulse 100   Ht 5' 2\" (1.575 m)   Wt (!) 320 lb 6.4 oz (145.3 kg)   SpO2 95%   BMI 58.60 kg/m²     General:  Alert, appropriate, no acute distress  HEENT:  Normocephalic, supple. Moist mucus membranes.   Cardio:  RRR, no murmurs, S1, S2  Pulmonary:  Clear bilaterally, good air entry  Dermatologic:  No rashes or lesions  EXT: no edema  MS: normal movement   NEURO: no gross deficits     Assessment and Plan:    1. 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 Please stop medication and notify office if any of these symptoms develop and are intolerable.   To ER if ever severe abdominal pain  Also discussed that may gain weight back after stops medication          - Ophthalmology Referral - In Network  - MICROALB/CREAT RATIO, RANDOM URINE [6517] [Q]  - Hemoglobin A1C (Glycohemoglobin) [E]  - LIPID PANEL [7600] [Q]  - COMP METABOLIC PANEL [89140] [Q]  - metFORMIN  MG Oral Tablet 24 Hr; Take 1 tablet (500 mg total) by mouth daily.  Dispense: 90 tablet; Refill: 0  - Tirzepatide (MOUNJARO) 2.5 MG/0.5ML Subcutaneous Solution Pen-injector; Inject 2.5 mg into the skin once a week for 4 doses.  Dispense: 2 mL; Refill: 0    2. Other fatigue      3. Morbid obesity with BMI of 50.0-59.9, adult ([facility])  Pt counseled on importance of weight loss and exercise. Recommend 30 min of cardio activity 5 times a week. Advised small high protein meals and snacks throughout day. Avoid carbs and sugars. Increase water and not to drink liquid calories. Pt given printed info on weight loss recommendations.     Recommend in person jump start program   Would benefit from Mounjaro see above.  - Tirzepatide (MOUNJARO) 2.5 MG/0.5ML Subcutaneous Solution Pen-injector; Inject 2.5 mg into the skin once a week for 4 doses.  Dispense: 2 mL; Refill: 0      4. Endometrial cancer ([facility])  Overdue f/u at [facility] with oncology  She has no complaints     5. Palpitations  No further episodes but will f/u with cardiology     There are no diagnoses linked to this encounter.  OPHTHALMOLOGY - INTERNAL  Orders Placed This Encounter   Procedures    MICROALB/CREAT RATIO, RANDOM URINE [6517] [Q]     Order Specific Question:   Release to patient     Answer:   Immediate    Hemoglobin A1C (Glycohemoglobin) [E]     Order Specific Question:   Release to patient     Answer:   Immediate    LIPID PANEL [7600] [Q]     Order Specific Question:   Release to patient     Answer:   Immediate    COMP METABOLIC PANEL [81635] [Q]     Order Specific Question:   Release to patient     Answer:   Immediate

## 2024-05-04 ENCOUNTER — OFFICE VISIT (OUTPATIENT)
Dept: INTERNAL MEDICINE CLINIC | Facility: CLINIC | Age: 59
End: 2024-05-04
Payer: COMMERCIAL

## 2024-05-04 VITALS
SYSTOLIC BLOOD PRESSURE: 120 MMHG | OXYGEN SATURATION: 95 % | BODY MASS INDEX: 53.92 KG/M2 | DIASTOLIC BLOOD PRESSURE: 72 MMHG | WEIGHT: 293 LBS | HEIGHT: 62 IN | HEART RATE: 100 BPM

## 2024-05-04 DIAGNOSIS — C54.1 ENDOMETRIAL CANCER (HCC): ICD-10-CM

## 2024-05-04 DIAGNOSIS — E11.9 TYPE 2 DIABETES MELLITUS WITHOUT COMPLICATION, WITHOUT LONG-TERM CURRENT USE OF INSULIN (HCC): Primary | ICD-10-CM

## 2024-05-04 DIAGNOSIS — E66.01 MORBID OBESITY WITH BMI OF 50.0-59.9, ADULT (HCC): ICD-10-CM

## 2024-05-04 DIAGNOSIS — R00.2 PALPITATIONS: ICD-10-CM

## 2024-05-04 DIAGNOSIS — R53.83 OTHER FATIGUE: ICD-10-CM

## 2024-05-04 PROCEDURE — 99214 OFFICE O/P EST MOD 30 MIN: CPT | Performed by: FAMILY MEDICINE

## 2024-05-04 RX ORDER — SEMAGLUTIDE 0.68 MG/ML
INJECTION, SOLUTION SUBCUTANEOUS
Qty: 1 EACH | Refills: 1 | Status: SHIPPED | OUTPATIENT
Start: 2024-05-04 | End: 2024-05-04

## 2024-05-04 RX ORDER — TIRZEPATIDE 2.5 MG/.5ML
2.5 INJECTION, SOLUTION SUBCUTANEOUS WEEKLY
Qty: 2 ML | Refills: 0 | Status: SHIPPED | OUTPATIENT
Start: 2024-05-04 | End: 2024-05-26

## 2024-05-04 RX ORDER — METFORMIN HYDROCHLORIDE 500 MG/1
500 TABLET, EXTENDED RELEASE ORAL DAILY
Qty: 90 TABLET | Refills: 0 | Status: SHIPPED | OUTPATIENT
Start: 2024-05-04

## 2024-05-04 NOTE — PATIENT INSTRUCTIONS
Recommendations on weight loss:    - Drink 8 glasses of water a day/ 64 oz  - Do not drink your calories ( no regular pop, juice, high calorie coffee drinks, limit alcohol)  - Eat high protein meals and snacks- aim for 15-30 gm of protein / meal and chose snacks that are high in protein ( ex hard boiled eggs, string cheese, cottage cheese, greek yogurt. Natural fats  and lean meats are also a good source of protein.  Limit carbs to 100 gm/ day. When choosing carbs chose healthy carbs ( fruit/ veggies/ whole grain options/ sweet potatoes). Dietdoctor.com is good resource for this.  - Don't deprive yourself of food you like, just limit amount and make smart choices  - Write down everything you eat for a few days- right away and think about why you are eating ( are you hungry, or are you eating because you are bored, tired, etc)- to get back on track or use Lose it Vidya  - Do not eat late at night  - Exercise- aim for 30 minutes 5 times a week of cardiac activity. Also strength training 2-3 times a week  30 minutes 5 times a week is recommended for weight maintenance, but for weight loss the goal is 300 minutes per/ week   - Weight yourself once a week first thing in the morning  -start taking fiber supplement daily- ex psyllium ( ex citracel, metamucil or generic psyllium ( can get at [employer] ex)). This helps keep stools regular, helps you feel full and can be good for the heart. Also increasing fiber in your diet is helpful.   Work on stress and increased sleep        1. Cut down your sugar consumption  2. Cut down refined grains/ carbs  3. Eat a good amount of protein and natural fats ( lean meats/avocado)  4. Increase natural fiber ( fruits/veggies) 5-6 servings / day      Nutritional Goals :           Calorie-controlled diet:  approx 1500 -1800 shannen ( may be more or less depending on exercise), Limit carbohydrates to <100 gms per day, Protein goal of 100 gms per day.  Increase fiber to 25-35g     Use vidya LOSE IT or  MY FITNESS PAL to track calories  Goal to lose 1.5-2 lbs/ week     Take time to shop, read labels, plan healthy meals and snacks on the goal.  Protein shakes may help- example Premier protein or Orgain plant protein shake       Also consider weight watchers    Great resource: dietdoctor.com    Good books:   The Crockett Diet Solution ( helps with tips to stay motivated)  The Obesity Code and The Complete Guide to Intermittent Fasting - both about fasting and by Dr. Flaquito Lanza    Good recipes: [employer] blog     Podcast : strong as a working mom- Kaylynn Dutta MD. You don't need to be a working mom to find some of these tips helpful.    MY BEST ADVICE:  EAT LOW CARB AND SUGAR- looks at labels.   EAT HIGH PROTEIN TO FILL YOU UP  AND FOODS HIGH IN FIBER  EAT LESS PROCESSED FOODS/ MORE CLEAN EATING- this makes those two ideas above easier  EXERCISE FOR MOOD/ SLEEP/ENERGY / YOUR HEART/ AND HELP WITH WEIGHT LOSS . GET GOOD SLEEP.    IF YOU HAVE A BAD DAY, DON'T BEAT YOURSELF UP AND LOSE CONTROL. JUST GET BACK ON TRACK.

## 2024-05-30 DIAGNOSIS — E66.01 MORBID OBESITY WITH BMI OF 50.0-59.9, ADULT (HCC): ICD-10-CM

## 2024-05-30 DIAGNOSIS — E11.9 TYPE 2 DIABETES MELLITUS WITHOUT COMPLICATION, WITHOUT LONG-TERM CURRENT USE OF INSULIN (HCC): ICD-10-CM

## 2024-05-30 RX ORDER — TIRZEPATIDE 5 MG/.5ML
5 INJECTION, SOLUTION SUBCUTANEOUS WEEKLY
Qty: 2 ML | Refills: 0 | OUTPATIENT
Start: 2024-05-30 | End: 2024-06-21

## 2024-05-30 RX ORDER — TIRZEPATIDE 2.5 MG/.5ML
INJECTION, SOLUTION SUBCUTANEOUS
Qty: 2 ML | Refills: 0 | OUTPATIENT
Start: 2024-05-30

## 2024-05-31 NOTE — TELEPHONE ENCOUNTER
Called and spoke to the patient relaying Dr. Ren, message about her Maunjaro medication.  
Future Appointment:8/5/24      Last Appointment:5/4/24      Last Refill:5/4/24      Medication Requested:   Requested Prescriptions     Pending Prescriptions Disp Refills    MOUNJARO 2.5 MG/0.5ML Subcutaneous Solution Pen-injector [Pharmacy Med Name: MOUNJARO 2.5MG/0.5ML INJ ( 4 PENS)] 2 mL 0     Sig: ADMINISTER 2.5 MG UNDER THE SKIN 1 TIME A WEEK FOR 4 DOSES     Protocol :       Diabetes Medication Protocol [id number]/30/2024 10:42 AM   Protocol Details Microalbumin procedure in past 12 months or taking ACE/ARB    Last A1C < 7.5 and within past 6 months    In person appointment or virtual visit in the past 6 mos or appointment in next 3 mos    EGFRCR or GFRNAA > 50    GFR in the past 12 months        
Please let her know I sent in the next higher dose- 5 mg  If any problems she should let me know and keep her appt in July thanks  
---

## 2024-06-27 DIAGNOSIS — E11.9 TYPE 2 DIABETES MELLITUS WITHOUT COMPLICATION, WITHOUT LONG-TERM CURRENT USE OF INSULIN (HCC): ICD-10-CM

## 2024-06-28 RX ORDER — TIRZEPATIDE 5 MG/.5ML
INJECTION, SOLUTION SUBCUTANEOUS
Qty: 2 ML | Refills: 0 | Status: SHIPPED | OUTPATIENT
Start: 2024-06-28

## 2024-08-05 ENCOUNTER — OFFICE VISIT (OUTPATIENT)
Dept: INTERNAL MEDICINE CLINIC | Facility: CLINIC | Age: 59
End: 2024-08-05
Payer: COMMERCIAL

## 2024-08-05 VITALS
WEIGHT: 293 LBS | HEART RATE: 101 BPM | DIASTOLIC BLOOD PRESSURE: 78 MMHG | HEIGHT: 62 IN | BODY MASS INDEX: 53.92 KG/M2 | OXYGEN SATURATION: 98 % | SYSTOLIC BLOOD PRESSURE: 116 MMHG

## 2024-08-05 DIAGNOSIS — E11.9 TYPE 2 DIABETES MELLITUS WITHOUT COMPLICATION, WITHOUT LONG-TERM CURRENT USE OF INSULIN (HCC): Primary | ICD-10-CM

## 2024-08-05 DIAGNOSIS — E66.01 MORBID OBESITY WITH BMI OF 50.0-59.9, ADULT (HCC): ICD-10-CM

## 2024-08-05 PROCEDURE — 90471 IMMUNIZATION ADMIN: CPT | Performed by: FAMILY MEDICINE

## 2024-08-05 PROCEDURE — 99214 OFFICE O/P EST MOD 30 MIN: CPT | Performed by: FAMILY MEDICINE

## 2024-08-05 PROCEDURE — 90750 HZV VACC RECOMBINANT IM: CPT | Performed by: FAMILY MEDICINE

## 2024-08-05 RX ORDER — TIRZEPATIDE 7.5 MG/.5ML
7.5 INJECTION, SOLUTION SUBCUTANEOUS WEEKLY
Qty: 2 ML | Refills: 0 | Status: SHIPPED | OUTPATIENT
Start: 2024-08-05 | End: 2024-08-27

## 2024-08-05 NOTE — PROGRESS NOTES
CC:  Follow - Up      Hx of CC:    DM visit  Stopped MTF- gave diarrhea   On  Mounjaro 5 mg . Tolerating well. A little bloating for a few hours  No constipation   Has helped a lot to help how she thinks about food  Cut down all junk food    Energy a little better  Not exercising  yet but wants to soon    No N/V    no further palpitations.  No chest pain.  No shortness of but did not follow-up with cardiology    Wt Readings from Last 6 Encounters:   08/05/24 294 lb 12.8 oz (133.7 kg)   05/04/24 (!) 320 lb 6.4 oz (145.3 kg)   04/18/24 (!) 322 lb 9.6 oz (146.3 kg)   04/29/22 299 lb (135.6 kg)   03/24/22 (!) 305 lb (138.3 kg)   01/28/22 (!) 307 lb 14.4 oz (139.7 kg)       Allergies:  Allergies   Allergen Reactions    Melatonin SWELLING     Swelling of her eyes and face     Erythromycin      Other reaction(s): Unknown    Penicillins      Other reaction(s): Urticaria    Amoxicillin-Pot Clavulanate RASH      Current Meds:  Current Outpatient Medications   Medication Sig Dispense Refill    Tirzepatide (MOUNJARO) 7.5 MG/0.5ML Subcutaneous Solution Pen-injector Inject 7.5 mg into the skin once a week for 4 doses. 2 mL 0    MOUNJARO 5 MG/0.5ML Subcutaneous Solution Pen-injector ADMINISTER 5 MG UNDER THE SKIN 1 TIME A WEEK FOR 4 DOSES 2 mL 0    metFORMIN  MG Oral Tablet 24 Hr Take 1 tablet (500 mg total) by mouth daily. 90 tablet 0        History:  Past Medical History:    Asthma (HCC)    Binge eating disorder    Morbid obesity with BMI of 50.0-59.9, adult (HCC)    DANIEL (obstructive sleep apnea)    Vitamin D deficiency      Past Surgical History:   Procedure Laterality Date    Other  1990    gastric surgery in [place]      Family History   Problem Relation Age of Onset    Pulmonary Disease Other         Emphysema  Family H/O    Thyroid Disorder Mother         Hypothyroid    High Blood Pressure Mother     DCIS Mother     Thyroid Disorder Sister         Hypothryroid    Heart Attack Paternal Uncle       Family Status   Relation  Status    Other (Not Specified)    Mo (Not Specified)    Sis (Not Specified)    PGFA Other    Paternal Unc       Social History     Socioeconomic History    Marital status: Single   Tobacco Use    Smoking status: Never    Smokeless tobacco: Never   Substance and Sexual Activity    Alcohol use: No   Other Topics Concern    Caffeine Concern No            ROS:  General:  No fever, + weight loss and improved energy     Cardio:  No chest pain   Pulmonary:  No cough  GI:  No N/V/D    Physical:    /78   Pulse 101   Ht 5' 2\" (1.575 m)   Wt 294 lb 12.8 oz (133.7 kg)   SpO2 98%   BMI 53.92 kg/m²     General:  Alert, appropriate, no acute distress  HEENT:  Normocephalic, supple. Moist mucus membranes.   Cardio:  RRR, no murmurs, S1, S2  Pulmonary:  Clear bilaterally, good air entry  Dermatologic:  No rashes or lesions  EXT: no edema  MS: normal movement   NEURO: no gross deficits   Bilateral barefoot skin diabetic exam is normal, visualized feet and the appearance is normal.  Bilateral monofilament/sensation of both feet is normal.  Pulsation pedal pulse exam of both lower legs/feet is normal as well.     Assessment and Plan:    1. Type 2 diabetes mellitus without complication, without long-term current use of insulin (HCC)  Check labs  Tolerating Mounjaro 5 mg well.  Will increase dose and titrate up monthly.  Follow-up 3 months.  Low carb diet   No personal or family history of MEN syndrome or medullary thyroid cancer   Patient counselled regarding possible side effects including injection site reactions, nausea, vomiting, diarrhea, pancreatitis,  and gastroparesis . Please stop medication and notify office if any of these symptoms develop and are intolerable.  To ER if ever severe abdominal pain      - Comp Metabolic Panel (14) [E]  - Lipid Panel [E]  - Hemoglobin A1C (Glycohemoglobin) [E]  - Microalb/Creat Ratio, Random Urine [E]  - Tirzepatide (MOUNJARO) 7.5 MG/0.5ML Subcutaneous Solution Pen-injector;  Inject 7.5 mg into the skin once a week for 4 doses.  Dispense: 2 mL; Refill: 0  - Zoster Recombinant Adjuvanted (Shingrix -Shingles) [25430]  - Diabetic Retinopathy Exam  OU - Both Eyes; Future    2. Morbid obesity with BMI of 50.0-59.9, adult (HCC)  Has lost over 20 lbs   Work on protein in diet and increase exercise       Reminded to get mammogram and follow-up with cardiology  There are no diagnoses linked to this encounter.  ZOSTER VACC RECOMBINANT IM NJX  Orders Placed This Encounter   Procedures    Comp Metabolic Panel (14) [E]     Order Specific Question:   Release to patient     Answer:   Immediate    Lipid Panel [E]     Order Specific Question:   Release to patient     Answer:   Immediate    Hemoglobin A1C (Glycohemoglobin) [E]     Order Specific Question:   Release to patient     Answer:   Immediate    Microalb/Creat Ratio, Random Urine [E]     Order Specific Question:   Release to patient     Answer:   Immediate

## 2024-08-06 LAB
ALBUMIN/GLOBULIN RATIO: 1.2 (CALC) (ref 1–2.5)
ALBUMIN: 3.8 G/DL (ref 3.6–5.1)
ALKALINE PHOSPHATASE: 63 U/L (ref 37–153)
ALT: 24 U/L (ref 6–29)
AST: 24 U/L (ref 10–35)
BILIRUBIN, TOTAL: 0.7 MG/DL (ref 0.2–1.2)
BUN/CREATININE RATIO: 11 (CALC) (ref 6–22)
BUN: 11 MG/DL (ref 7–25)
CALCIUM: 9.5 MG/DL (ref 8.6–10.4)
CARBON DIOXIDE: 25 MMOL/L (ref 20–32)
CHLORIDE: 105 MMOL/L (ref 98–110)
CHOL/HDLC RATIO: 3.8 (CALC)
CHOLESTEROL, TOTAL: 180 MG/DL
CREATININE, RANDOM URINE: 537 MG/DL (ref 20–275)
CREATININE: 1.04 MG/DL (ref 0.5–1.03)
EGFR: 62 ML/MIN/1.73M2
GLOBULIN: 3.3 G/DL (CALC) (ref 1.9–3.7)
GLUCOSE: 120 MG/DL (ref 65–99)
HDL CHOLESTEROL: 48 MG/DL
HEMOGLOBIN A1C: 6.1 % OF TOTAL HGB
LDL-CHOLESTEROL: 105 MG/DL (CALC)
MICROALBUMIN/CREATININE RATIO, RANDOM URINE: 3 MG/G CREAT
MICROALBUMIN: 1.5 MG/DL
NON-HDL CHOLESTEROL: 132 MG/DL (CALC)
POTASSIUM: 4.8 MMOL/L (ref 3.5–5.3)
PROTEIN, TOTAL: 7.1 G/DL (ref 6.1–8.1)
SODIUM: 141 MMOL/L (ref 135–146)
TRIGLYCERIDES: 155 MG/DL

## 2024-09-03 DIAGNOSIS — E11.9 TYPE 2 DIABETES MELLITUS WITHOUT COMPLICATION, WITHOUT LONG-TERM CURRENT USE OF INSULIN (HCC): ICD-10-CM

## 2024-09-04 RX ORDER — TIRZEPATIDE 7.5 MG/.5ML
7.5 INJECTION, SOLUTION SUBCUTANEOUS WEEKLY
Qty: 2 ML | Refills: 0 | Status: SHIPPED | OUTPATIENT
Start: 2024-09-04

## 2024-09-04 NOTE — TELEPHONE ENCOUNTER
Future Appointment:11/4/24      Last Appointment:8/5/24      Last Refill:8/5/24      Medication Requested:   Requested Prescriptions     Pending Prescriptions Disp Refills    MOUNJARO 7.5 MG/0.5ML Subcutaneous Solution Pen-injector [Pharmacy Med Name: MOUNJARO 7.5MG/0.5ML INJ (4 PENS)] 2 mL 0     Sig: ADMINISTER 7.5 MG UNDER THE SKIN 1 TIME A WEEK FOR 4 DOSES

## 2024-09-30 DIAGNOSIS — E11.9 TYPE 2 DIABETES MELLITUS WITHOUT COMPLICATION, WITHOUT LONG-TERM CURRENT USE OF INSULIN (HCC): ICD-10-CM

## 2024-09-30 RX ORDER — TIRZEPATIDE 7.5 MG/.5ML
7.5 INJECTION, SOLUTION SUBCUTANEOUS WEEKLY
Qty: 2 ML | Refills: 0 | Status: SHIPPED | OUTPATIENT
Start: 2024-09-30

## 2024-09-30 NOTE — TELEPHONE ENCOUNTER
Future Appointment:11/4/24      Last Appointment:8/5/24      Last Refill:9/4/24      Medication Requested:   Requested Prescriptions     Pending Prescriptions Disp Refills    MOUNJARO 7.5 MG/0.5ML Subcutaneous Solution Pen-injector [Pharmacy Med Name: MOUNJARO 7.5MG/0.5ML INJ (4 PENS)] 2 mL 0     Sig: ADMINISTER 7.5 MG UNDER THE SKIN 1 TIME A WEEK FOR 4 DOSES

## 2024-10-02 DIAGNOSIS — E11.9 TYPE 2 DIABETES MELLITUS WITHOUT COMPLICATION, WITHOUT LONG-TERM CURRENT USE OF INSULIN (HCC): ICD-10-CM

## 2024-10-03 RX ORDER — TIRZEPATIDE 7.5 MG/.5ML
7.5 INJECTION, SOLUTION SUBCUTANEOUS WEEKLY
Qty: 2 ML | Refills: 0 | OUTPATIENT
Start: 2024-10-03

## 2024-11-03 NOTE — PROGRESS NOTES
CC:  Follow - Up      Hx of CC:    DM visit  Stopped MTF- gave diarrhea   On  Mounjaro 7.5 mg . Tolerating well.   No constipation   Has helped a lot to help how she thinks about food  Cut down all junk food      Has lost 50 lbs since starting GLP-1    Energy better  Not exercising  yet but more active     No N/V    HL didn't start lipitor     No CP, SOB or palpitations    Wt Readings from Last 6 Encounters:   11/04/24 273 lb (123.8 kg)   08/05/24 294 lb 12.8 oz (133.7 kg)   05/04/24 (!) 320 lb 6.4 oz (145.3 kg)   04/18/24 (!) 322 lb 9.6 oz (146.3 kg)   04/29/22 299 lb (135.6 kg)   03/24/22 (!) 305 lb (138.3 kg)       Allergies:  Allergies   Allergen Reactions    Melatonin SWELLING     Swelling of her eyes and face     Erythromycin      Other reaction(s): Unknown    Penicillins      Other reaction(s): Urticaria    Amoxicillin-Pot Clavulanate RASH      Current Meds:  Current Outpatient Medications   Medication Sig Dispense Refill    Tirzepatide (MOUNJARO) 10 MG/0.5ML Subcutaneous Solution Auto-injector Inject 10 mg into the skin once a week. 2 mL 1    MOUNJARO 7.5 MG/0.5ML Subcutaneous Solution Pen-injector ADMINISTER 7.5 MG UNDER THE SKIN 1 TIME A WEEK FOR 4 DOSES 2 mL 0    atorvastatin (LIPITOR) 10 MG Oral Tab Take 1 tablet (10 mg total) by mouth daily. 90 tablet 3    metFORMIN  MG Oral Tablet 24 Hr Take 1 tablet (500 mg total) by mouth daily. (Patient not taking: Reported on 11/4/2024) 90 tablet 0        History:  Past Medical History:    Asthma (HCC)    Binge eating disorder    Morbid obesity with BMI of 50.0-59.9, adult (HCC)    DANIEL (obstructive sleep apnea)    Vitamin D deficiency      Past Surgical History:   Procedure Laterality Date    Other  1990    gastric surgery in [place]      Family History   Problem Relation Age of Onset    Pulmonary Disease Other         Emphysema  Family H/O    Thyroid Disorder Mother         Hypothyroid    High Blood Pressure Mother     DCIS Mother     Thyroid Disorder Sister          Hypothryroid    Heart Attack Paternal Uncle       Family Status   Relation Status    Other (Not Specified)    Mo (Not Specified)    Sis (Not Specified)    PGFA Other    Paternal Unc       Social History     Socioeconomic History    Marital status: Single   Tobacco Use    Smoking status: Never    Smokeless tobacco: Never   Substance and Sexual Activity    Alcohol use: No   Other Topics Concern    Caffeine Concern No            ROS:  General:  No fever, + weight loss and improved energy   Cardio:  No chest pain   Pulmonary:  No cough  GI:  No N/V/D    Physical:    /68   Pulse 97   Ht 5' 2\" (1.575 m)   Wt 273 lb (123.8 kg)   SpO2 97%   BMI 49.93 kg/m²     General:  Alert, appropriate, no acute distress  HEENT:  Normocephalic, supple. Moist mucus membranes.   Cardio:  RRR, no murmurs, S1, S2  Pulmonary:  Clear bilaterally, good air entry  Dermatologic:  No rashes or lesions  EXT: no edema  MS: normal movement   NEURO: no gross deficits     Assessment and Plan:    1. Type 2 diabetes mellitus without complication, without long-term current use of insulin ([facility])  Doing well on mounjaro - has lost 50lbs  Tolerating well  Increase to 10 mg  F/u 3mos  No personal or family history of MEN syndrome or medullary thyroid cancer   Patient counselled regarding possible side effects including injection site reactions, nausea, vomiting, diarrhea, pancreatitis,  and gastroparesis . Please stop medication and notify office if any of these symptoms develop and are intolerable.  To ER if ever severe abdominal pain        - Diabetic Retinopathy Exam  OU - Both Eyes; Future  - Tirzepatide (MOUNJARO) 10 MG/0.5ML Subcutaneous Solution Auto-injector; Inject 10 mg into the skin once a week.  Dispense: 2 mL; Refill: 1  - Lipid Panel [E]  - Hemoglobin A1C (Glycohemoglobin) [E]  - Comp Metabolic Panel (14) [E]    2. Morbid obesity with BMI of 50.0-59.9, adult ([facility])    Pt counseled on importance of weight loss and  exercise. Recommend 30 min of cardio activity 5 times a week. Advised small high protein meals and snacks throughout day. Avoid carbs and sugars. Increase water and not to drink liquid calories. Pt given printed info on weight loss recommendations.     - Tirzepatide (MOUNJARO) 10 MG/0.5ML Subcutaneous Solution Auto-injector; Inject 10 mg into the skin once a week.  Dispense: 2 mL; Refill: 1    3. Hyperlipidemia, unspecified hyperlipidemia type  Discussed   Recommend start atorvastatin given she is diabetic.  Discussed possible side effects as muscle aches or cramps.  Patient will start it         Reminded to get mammogram   There are no diagnoses linked to this encounter.  None  Orders Placed This Encounter   Procedures    Lipid Panel [E]     Order Specific Question:   Release to patient     Answer:   Immediate    Hemoglobin A1C (Glycohemoglobin) [E]     Order Specific Question:   Release to patient     Answer:   Immediate    Comp Metabolic Panel (14) [E]     Order Specific Question:   Release to patient     Answer:   Immediate

## 2024-11-04 ENCOUNTER — OFFICE VISIT (OUTPATIENT)
Dept: INTERNAL MEDICINE CLINIC | Facility: CLINIC | Age: 59
End: 2024-11-04
Payer: COMMERCIAL

## 2024-11-04 VITALS
HEIGHT: 62 IN | HEART RATE: 97 BPM | SYSTOLIC BLOOD PRESSURE: 104 MMHG | DIASTOLIC BLOOD PRESSURE: 68 MMHG | WEIGHT: 273 LBS | BODY MASS INDEX: 50.24 KG/M2 | OXYGEN SATURATION: 97 %

## 2024-11-04 DIAGNOSIS — E66.01 MORBID OBESITY WITH BMI OF 50.0-59.9, ADULT (HCC): ICD-10-CM

## 2024-11-04 DIAGNOSIS — E11.9 TYPE 2 DIABETES MELLITUS WITHOUT COMPLICATION, WITHOUT LONG-TERM CURRENT USE OF INSULIN (HCC): Primary | ICD-10-CM

## 2024-11-04 DIAGNOSIS — E78.5 HYPERLIPIDEMIA, UNSPECIFIED HYPERLIPIDEMIA TYPE: ICD-10-CM

## 2024-11-04 PROCEDURE — 90471 IMMUNIZATION ADMIN: CPT | Performed by: FAMILY MEDICINE

## 2024-11-04 PROCEDURE — 90686 IIV4 VACC NO PRSV 0.5 ML IM: CPT | Performed by: FAMILY MEDICINE

## 2024-11-04 PROCEDURE — 99214 OFFICE O/P EST MOD 30 MIN: CPT | Performed by: FAMILY MEDICINE

## 2024-11-04 RX ORDER — TIRZEPATIDE 10 MG/.5ML
10 INJECTION, SOLUTION SUBCUTANEOUS WEEKLY
Qty: 2 ML | Refills: 1 | Status: SHIPPED | OUTPATIENT
Start: 2024-11-04

## 2024-11-04 RX ORDER — ATORVASTATIN CALCIUM 10 MG/1
10 TABLET, FILM COATED ORAL DAILY
Qty: 90 TABLET | Refills: 3 | Status: SHIPPED | OUTPATIENT
Start: 2024-11-04 | End: 2025-10-30

## 2024-11-04 NOTE — PATIENT INSTRUCTIONS
If I am prescribing weight loss medicine for you, you need to see me every 3 months for refills and for the best success!   The goal is not always weight loss, but better health. This can be measured with labs,  your vital signs and how you are feeling.  Please discuss any questions or challenges you are having with me.      Weight loss recommendations:    For diet:   Focus on less carbs and more protein in your diet.          Limit carbohydrates to <100 gms per day, Protein goal of 100 gms per day ( or 1.3-1.6 grams of protein per kilogram per day).  Increase fiber to 25-35g   To do this: cut down on sugar, carbs, increase protein and natural fats ( lean meats, avocado, eggs ), increase natural fiber with fruits and veggies   Good sources of protein:  Chicken, lean meat, salmon,  shrimp, eggs, quinoa, nut butter, almonds, nuts, lentils, black beans, greek yogurt, chickpeas , cottage cheese     When reading labels- look for high protein and if carbs, better to have higher fiber  with carbs so net carbs are lower.     Take time to shop, read labels, plan healthy meals and snacks on the goal.  Protein shakes may help- example Premier protein     - Drink 8 glasses of water a day/ 64 oz  - Do not drink your calories ( no regular pop, juice, high calorie coffee drinks, limit alcohol)  - Eat high protein meals and snacks- aim for 15-30 gm of protein / meal and chose snacks that are high in protein ( ex hard boiled eggs, string cheese, cottage cheese, greek yogurt. Natural fats  and lean meats are also a good source of protein.  Limit carbs to 100 gm/ day. When choosing carbs chose healthy carbs ( fruit/ veggies/ whole grain options/ sweet potatoes). Dietdoctor.com is good resource for this.  - Don't deprive yourself of food you like, just limit amount and make smart choices    Exercise:   - Exercise- aim for 30 minutes 5 times a week of cardiac activity. Also strength training 2-3 times a week  30 minutes 5 times a week  is recommended for weight maintenance, but for weight loss the goal is 300 minutes per/ week   [employer] Vidya  Even with those goals above, some exercise is better then none. If you only have 20 minutes in the morning, do it!     Behavior:   - Write down everything you eat for a few days- right away and think about why you are eating ( are you hungry, or are you eating because you are bored, tired, etc)- to get back on track or use Lose it Vidya  - Do not eat late at night  -work on stress and sleep.  - Weight yourself once a week first thing in the morning  Use vidya LOSE IT or MY FITNESS PAL to track calories  Calm vidya to help with stress     Supplements:  Vitamin D  -start taking fiber supplement daily- ex psyllium ( ex citracel, metamucil or generic psyllium ( can get at [employer] ex)). This helps keep stools regular, helps you feel full and can be good for the heart. Also increasing fiber in your diet is helpful.     Resources:       Healthy food info: dietdoctor.com    Good recipes: skinaline blog     Podcast : strong as a working mom- Kaylynn Dtuta MD. You don't need to be a working mom to find some of these tips helpful.        MY BEST ADVICE:  EAT LOW CARB AND LOW SUGAR- looks at labels.   EAT HIGH PROTEIN TO FILL YOU UP  AND FOODS HIGH IN FIBER  EAT LESS PROCESSED FOODS/ MORE CLEAN EATING- this makes those two ideas above easier  EXERCISE FOR MOOD/ SLEEP/ENERGY / YOUR HEART/ AND HELP WITH WEIGHT LOSS . GET GOOD SLEEP.    IF YOU HAVE A BAD DAY, DON'T BEAT YOURSELF UP AND LOSE CONTROL. JUST GET BACK ON TRACK.

## 2024-11-06 ENCOUNTER — NURSE ONLY (OUTPATIENT)
Dept: INTERNAL MEDICINE CLINIC | Facility: CLINIC | Age: 59
End: 2024-11-06
Payer: COMMERCIAL

## 2024-11-06 DIAGNOSIS — E11.9 TYPE 2 DIABETES MELLITUS WITHOUT COMPLICATION, WITHOUT LONG-TERM CURRENT USE OF INSULIN (HCC): ICD-10-CM

## 2024-11-06 PROCEDURE — 92229 IMG RTA DETC/MNTR DS POC ALY: CPT | Performed by: FAMILY MEDICINE

## 2024-11-06 NOTE — PROGRESS NOTES
Patient came in for retinal exam. I verified orders name/. I was able to capture images on both eyes.

## 2024-12-26 DIAGNOSIS — E66.01 MORBID OBESITY WITH BMI OF 50.0-59.9, ADULT (HCC): ICD-10-CM

## 2024-12-26 DIAGNOSIS — E11.9 TYPE 2 DIABETES MELLITUS WITHOUT COMPLICATION, WITHOUT LONG-TERM CURRENT USE OF INSULIN (HCC): ICD-10-CM

## 2024-12-26 RX ORDER — TIRZEPATIDE 10 MG/.5ML
INJECTION, SOLUTION SUBCUTANEOUS
Qty: 2 ML | Refills: 1 | Status: SHIPPED | OUTPATIENT
Start: 2024-12-26

## 2025-01-29 NOTE — PROGRESS NOTES
CC:  Follow - Up      Hx of CC:    DM visit    On  Mounjaro 10 mg . Tolerating well.   No side effects   Has helped a lot to help how she thinks about food  Cut down all junk food  Eating eggs, protein bars, chicken  Focusing on protein    Starting weight 322  Today 252    Has lost  70  lbs since starting GLP-1!    Energy better  Not exercising  yet but more active     No N/V    HL didn't start lipitor   Doesn't want side effects    No CP, SOB or palpitations    Wt Readings from Last 6 Encounters:   01/30/25 252 lb 12.8 oz (114.7 kg)   11/04/24 273 lb (123.8 kg)   08/05/24 294 lb 12.8 oz (133.7 kg)   05/04/24 (!) 320 lb 6.4 oz (145.3 kg)   04/18/24 (!) 322 lb 9.6 oz (146.3 kg)   04/29/22 299 lb (135.6 kg)       Allergies:  Allergies   Allergen Reactions    Melatonin SWELLING     Swelling of her eyes and face     Erythromycin      Other reaction(s): Unknown    Penicillins      Other reaction(s): Urticaria    Amoxicillin-Pot Clavulanate RASH      Current Meds:  Current Outpatient Medications   Medication Sig Dispense Refill    Tirzepatide (MOUNJARO) 12.5 MG/0.5ML Subcutaneous Solution Auto-injector Inject 12.5 mg into the skin once a week. 6 mL 1    atorvastatin (LIPITOR) 10 MG Oral Tab Take 1 tablet (10 mg total) by mouth daily. 90 tablet 3    metFORMIN  MG Oral Tablet 24 Hr Take 1 tablet (500 mg total) by mouth daily. (Patient not taking: Reported on 1/30/2025) 90 tablet 0        History:  Past Medical History:    Asthma (HCC)    Binge eating disorder    Morbid obesity with BMI of 50.0-59.9, adult (HCC)    DANIEL (obstructive sleep apnea)    Vitamin D deficiency      Past Surgical History:   Procedure Laterality Date    Other  1990    gastric surgery in [place]      Family History   Problem Relation Age of Onset    Pulmonary Disease Other         Emphysema  Family H/O    Thyroid Disorder Mother         Hypothyroid    High Blood Pressure Mother     DCIS Mother     Thyroid Disorder Sister         Hypothryroid    Heart  Attack Paternal Uncle       Family Status   Relation Status    Other (Not Specified)    Mo (Not Specified)    Sis (Not Specified)    PGFA Other    Paternal Unc       Social History     Socioeconomic History    Marital status: Single   Tobacco Use    Smoking status: Never    Smokeless tobacco: Never   Substance and Sexual Activity    Alcohol use: No   Other Topics Concern    Caffeine Concern No            ROS:  General:  No fever, + weight loss and improved energy   Cardio:  No chest pain   Pulmonary:  No cough  GI:  No N/V/D    Physical:    /68   Pulse 108   Ht 5' 2\" (1.575 m)   Wt 252 lb 12.8 oz (114.7 kg)   SpO2 98%   BMI 46.24 kg/m²     General:  Alert, appropriate, no acute distress  HEENT:  Normocephalic, supple. Moist mucus membranes.   Cardio:  RRR, no murmurs, S1, S2  Pulmonary:  Clear bilaterally, good air entry  Dermatologic:  No rashes or lesions  EXT: no edema  MS: normal movement   NEURO: no gross deficits     The 10-year ASCVD risk score (Samanta SCHAEFER, et al., 2019) is: 4.8%    Values used to calculate the score:      Age: 59 years      Sex: Female      Is Non- : No      Diabetic: Yes      Tobacco smoker: No      Systolic Blood Pressure: 110 mmHg      Is BP treated: No      HDL Cholesterol: 48 mg/dL      Total Cholesterol: 180 mg/dL        Assessment and Plan:    1. Type 2 diabetes mellitus without complication, without long-term current use of insulin (HCC)  Doing well on mounjaro - has lost 50lbs  Tolerating well  Increase to 12.5 mg  F/u 3mos  No personal or family history of MEN syndrome or medullary thyroid cancer   Patient counselled regarding possible side effects including injection site reactions, nausea, vomiting, diarrhea, pancreatitis,  and gastroparesis . Please stop medication and notify office if any of these symptoms develop and are intolerable.  To ER if ever severe abdominal pain      - Tirzepatide (MOUNJARO) 12.5 MG/0.5ML Subcutaneous Solution  Auto-injector; Inject 12.5 mg into the skin once a week.  Dispense: 6 mL; Refill: 1    - Lipid Panel [E]  - Hemoglobin A1C (Glycohemoglobin) [E]  - Comp Metabolic Panel (14) [E]    2. Morbid obesity with BMI of 50.0-59.9, adult (HCC)    Pt counseled on importance of weight loss and exercise. Recommend 30 min of cardio activity 5 times a week. Advised small high protein meals and snacks throughout day. Avoid carbs and sugars. Increase water and not to drink liquid calories. Pt given printed info on weight loss recommendations.   Doing great  Lost 70 pounds.  Follow-up 3 months.  Goal over next months is to work on increasing exercise.        3. Hyperlipidemia, unspecified hyperlipidemia type  Discussed   Recommend start atorvastatin given she is diabetic.  Discussed possible side effects as muscle aches or cramps.       4. Physical exam  Will do annual physical at next visit also.  Check labs prior.  - CBC W Differential W Platelet [E]  - Comp Metabolic Panel (14)  - TSH W Reflex To Free T4  - Lipid Panel  - Hemoglobin A1C (Glycohemoglobin) [E]        Reminded to get mammogram   There are no diagnoses linked to this encounter.  None  Orders Placed This Encounter   Procedures    Microalb/Creat Ratio, Random Urine [E]     Order Specific Question:   Release to patient     Answer:   Immediate    CBC W Differential W Platelet [E]     Order Specific Question:   Release to patient     Answer:   Immediate    Comp Metabolic Panel (14)    TSH W Reflex To Free T4    Lipid Panel    Hemoglobin A1C (Glycohemoglobin) [E]     Order Specific Question:   Release to patient     Answer:   Immediate

## 2025-01-30 ENCOUNTER — OFFICE VISIT (OUTPATIENT)
Dept: INTERNAL MEDICINE CLINIC | Facility: CLINIC | Age: 60
End: 2025-01-30
Payer: COMMERCIAL

## 2025-01-30 VITALS
WEIGHT: 252.81 LBS | SYSTOLIC BLOOD PRESSURE: 110 MMHG | DIASTOLIC BLOOD PRESSURE: 68 MMHG | OXYGEN SATURATION: 98 % | HEIGHT: 62 IN | BODY MASS INDEX: 46.52 KG/M2 | HEART RATE: 108 BPM

## 2025-01-30 DIAGNOSIS — Z00.00 PHYSICAL EXAM: ICD-10-CM

## 2025-01-30 DIAGNOSIS — E11.9 TYPE 2 DIABETES MELLITUS WITHOUT COMPLICATION, WITHOUT LONG-TERM CURRENT USE OF INSULIN (HCC): Primary | ICD-10-CM

## 2025-01-30 DIAGNOSIS — E78.5 HYPERLIPIDEMIA, UNSPECIFIED HYPERLIPIDEMIA TYPE: ICD-10-CM

## 2025-01-30 DIAGNOSIS — E66.01 OBESITY, CLASS III, BMI 40-49.9 (MORBID OBESITY) (HCC): ICD-10-CM

## 2025-01-30 PROCEDURE — 99214 OFFICE O/P EST MOD 30 MIN: CPT | Performed by: FAMILY MEDICINE

## 2025-01-30 RX ORDER — TIRZEPATIDE 12.5 MG/.5ML
12.5 INJECTION, SOLUTION SUBCUTANEOUS WEEKLY
Qty: 6 ML | Refills: 1 | Status: SHIPPED | OUTPATIENT
Start: 2025-01-30

## 2025-04-04 LAB
ABSOLUTE BASOPHILS: 50 CELLS/UL (ref 0–200)
ABSOLUTE EOSINOPHILS: 212 CELLS/UL (ref 15–500)
ABSOLUTE LYMPHOCYTES: 1139 CELLS/UL (ref 850–3900)
ABSOLUTE MONOCYTES: 459 CELLS/UL (ref 200–950)
ABSOLUTE NEUTROPHILS: 2642 CELLS/UL (ref 1500–7800)
ALBUMIN/GLOBULIN RATIO: 1.4 (CALC) (ref 1–2.5)
ALBUMIN: 4 G/DL (ref 3.6–5.1)
ALKALINE PHOSPHATASE: 54 U/L (ref 37–153)
ALT: 12 U/L (ref 6–29)
AST: 18 U/L (ref 10–35)
BASOPHILS: 1.1 %
BILIRUBIN, TOTAL: 0.6 MG/DL (ref 0.2–1.2)
BUN: 14 MG/DL (ref 7–25)
CALCIUM: 9.2 MG/DL (ref 8.6–10.4)
CARBON DIOXIDE: 28 MMOL/L (ref 20–32)
CHLORIDE: 104 MMOL/L (ref 98–110)
CHOL/HDLC RATIO: 3.8 (CALC)
CHOLESTEROL, TOTAL: 155 MG/DL
CREATININE, RANDOM URINE: 615 MG/DL (ref 20–275)
CREATININE: 0.95 MG/DL (ref 0.5–1.05)
EGFR: 69 ML/MIN/1.73M2
EOSINOPHILS: 4.7 %
GLOBULIN: 2.8 G/DL (CALC) (ref 1.9–3.7)
GLUCOSE: 97 MG/DL (ref 65–99)
HDL CHOLESTEROL: 41 MG/DL
HEMATOCRIT: 45.5 % (ref 35–45)
HEMOGLOBIN A1C: 5.6 % OF TOTAL HGB
HEMOGLOBIN: 14.7 G/DL (ref 11.7–15.5)
LDL-CHOLESTEROL: 89 MG/DL (CALC)
LYMPHOCYTES: 25.3 %
MCH: 29.5 PG (ref 27–33)
MCHC: 32.3 G/DL (ref 32–36)
MCV: 91.2 FL (ref 80–100)
MICROALBUMIN/CREATININE RATIO, RANDOM URINE: 8 MG/G CREAT
MICROALBUMIN: 4.9 MG/DL
MONOCYTES: 10.2 %
MPV: 10.8 FL (ref 7.5–12.5)
NEUTROPHILS: 58.7 %
NON-HDL CHOLESTEROL: 114 MG/DL (CALC)
PLATELET COUNT: 224 THOUSAND/UL (ref 140–400)
POTASSIUM: 3.9 MMOL/L (ref 3.5–5.3)
PROTEIN, TOTAL: 6.8 G/DL (ref 6.1–8.1)
RDW: 12.8 % (ref 11–15)
RED BLOOD CELL COUNT: 4.99 MILLION/UL (ref 3.8–5.1)
SODIUM: 142 MMOL/L (ref 135–146)
TRIGLYCERIDES: 150 MG/DL
TSH W/REFLEX TO FT4: 2.3 MIU/L (ref 0.4–4.5)
WHITE BLOOD CELL COUNT: 4.5 THOUSAND/UL (ref 3.8–10.8)

## 2025-04-22 NOTE — PROGRESS NOTES
HPI:   Linnea Chávez is a 59 year old female who presents for a complete physical exam and for DM visit    Last mammo: needs  Menses:    Had hysterectomy- endometrial cancer  2021- was seeing gyn onc at [facility]   And Dr Maria Elena Booker  Previous colonoscopy:  Never     DM visit     On  Mounjaro 12.5 mg . Tolerating well.   No side effects   Has helped a lot to help how she thinks about food  Cut down all junk food  Eating eggs, protein bars, chicken  Focusing on protein     Starting weight 322 may 5th  2024   Today 235     Has lost  over 80  lbs since starting GLP-1!     Energy better  Walking now     No N/V     HL didn't start lipitor but will   Doesn't want side effects     No CP, SOB or palpitations      Does use cpap machine- sees Dr Griffin          Nursing instructor- [employer]   Lives alone  No kids  3 cats        Wt Readings from Last 6 Encounters:   04/24/25 235 lb 6.4 oz (106.8 kg)   01/30/25 252 lb 12.8 oz (114.7 kg)   11/04/24 273 lb (123.8 kg)   08/05/24 294 lb 12.8 oz (133.7 kg)   05/04/24 (!) 320 lb 6.4 oz (145.3 kg)   04/18/24 (!) 322 lb 9.6 oz (146.3 kg)     Body mass index is 43.06 kg/m².     CHOLESTEROL, TOTAL (mg/dL)   Date Value   04/03/2025 155   08/05/2024 180   04/18/2024 166     HDL CHOLESTEROL (mg/dL)   Date Value   04/03/2025 41 (L)   08/05/2024 48 (L)   04/18/2024 52     LDL-CHOLESTEROL (mg/dL (calc))   Date Value   04/03/2025 89   08/05/2024 105 (H)   04/18/2024 92        Current Outpatient Medications   Medication Sig Dispense Refill    Tirzepatide (MOUNJARO) 12.5 MG/0.5ML Subcutaneous Solution Auto-injector Inject 12.5 mg into the skin once a week. 6 mL 1    atorvastatin (LIPITOR) 10 MG Oral Tab Take 1 tablet (10 mg total) by mouth daily. (Patient not taking: Reported on 4/24/2025) 90 tablet 3    metFORMIN  MG Oral Tablet 24 Hr Take 1 tablet (500 mg total) by mouth daily. (Patient not taking: Reported on 4/24/2025) 90 tablet 0      Past Medical History:    Asthma (HCC)     Binge eating disorder    Morbid obesity with BMI of 50.0-59.9, adult (HCC)    DANIEL (obstructive sleep apnea)    Vitamin D deficiency      Past Surgical History:   Procedure Laterality Date    Other  1990    gastric surgery in [place]      Family History   Problem Relation Age of Onset    Pulmonary Disease Other         Emphysema  Family H/O    Thyroid Disorder Mother         Hypothyroid    High Blood Pressure Mother     DCIS Mother     Thyroid Disorder Sister         Hypothryroid    Heart Attack Paternal Uncle       Social History:   Social History     Socioeconomic History    Marital status: Single   Tobacco Use    Smoking status: Never    Smokeless tobacco: Never   Substance and Sexual Activity    Alcohol use: No   Other Topics Concern    Caffeine Concern No          REVIEW OF SYSTEMS:   GENERAL: feels well otherwise  SKIN: denies any unusual skin lesions  LUNGS: no cough,  CARDIOVASCULAR: denies chest pain   GI: denies abdominal pain,  No constipation or diarrhea  :  Had UTI around a month ago and saw blood in urine with wiping twice. Then got treated for UTI and no further episodes of bleeding     EXAM:   /70   Pulse 100   Ht 5' 2\" (1.575 m)   Wt 235 lb 6.4 oz (106.8 kg)   SpO2 97%   BMI 43.06 kg/m²   Body mass index is 43.06 kg/m².   GENERAL: well developed, well nourished,in no apparent distress  SKIN: no rashes,no suspicious lesions  HEENT: atraumatic, normocephalic,  EYES:,conjunctiva are clear  NECK: supple,no adenopathy  BREAST: no dominant or suspicious mass, no nipple discharge  LUNGS: clear to auscultation  CARDIO: RRR without murmur  GI: good BS's,no masses, HSM or tenderness   EXTREMITIES: no edema    ASSESSMENT AND PLAN:   Linnea Chávez is a 59 year old female who presents for a complete physical exam.  Encounter Diagnoses   Name Primary?    Physical exam Yes    Encounter for screening mammogram for malignant neoplasm of breast     Type 2 diabetes mellitus without complication,  without long-term current use of insulin (HCC)     Obesity, Class III, BMI 40-49.9 (morbid obesity)     Colon cancer screening     Proteinuria, unspecified type     Hyperlipidemia, unspecified hyperlipidemia type      Discussed with patient pap smear guidelines and the importance of screening for cervical cancer- overdue- will see gyn given other issues  Discussed the importance of yearly mammograms starting at age 40 to help detect breast cancer.   Self breast exam explained and encouraged to perform monthly.   Health maintenance labs, recommend yearly: Lipids, CMP, and CBC.   Discussed importance of screening for colon cancer with colonoscopies starting at age 45, or sooner if high risk  Recommended low fat diet, low carb diet and regular aerobic exercise.    The patient indicates understanding of these issues and agrees to the plan.  The patient is asked to return for CPX in 1 yr.      1. Physical exam  Reviewed labs   Recommend follow-up with her OB/GYN for pelvic exam.  History of endometrial cancer  Discussed importance of staying up on all cancer screenings    2. Encounter for screening mammogram for malignant neoplasm of breast      - [facility] VANDANA 2D+3D SCREENING BILAT (CPT=77067/59596); Future    3. Type 2 diabetes mellitus without complication, without long-term current use of insulin (HCC)    Controlled   A1c 5.6  Continue Mounjaro 12.5 mg and follow-up in 4 months    4. Obesity, Class III, BMI 40-49.9 (morbid obesity)  Has lost over 80 pounds with Mounjaro and diet changes.  Her energy is better.  Recommend increase exercise and continue focusing on protein.  Also add light weights to help with building muscle.  Follow-up for      5. Colon cancer screening      - COLOGUARD COLON CANCER SCREENING (EXTERNAL)    6. Proteinuria, unspecified type  Increase urine creatinine and urine microalbumin test.  Check urinalysis today.  May need referral to nephrology or urology pending results.   - Urinalysis, Routine  [E][If pt <2 yrs old, must also order Reducing Substance ([id number])]  - Urine Culture, Routine [E]    7. Hyperlipidemia, unspecified hyperlipidemia type  LDL 89.  Given diabetes recommend start statin.  She picked it up and will start.  Recheck fasting lipids in 6 to 8 weeks  - Lipid Panel [E]      8. Visible hematuria with likely UTI   Check UA and would like her to see urology.  Pt also to get seen right away if seeing blood in urine.    F/u 4 mos         Orders Placed This Encounter   Procedures    Urinalysis, Routine [E][If pt <2 yrs old, must also order Reducing Substance ([id number])]    Lipid Panel [E]    Urine Culture, Routine [E]       Meds & Refills for this Visit:  Requested Prescriptions      No prescriptions requested or ordered in this encounter       Imaging & Consults:  COLOGUARD COLON CANCER SCREENING (EXTERNAL)  [facility] VANDANA 2D+3D SCREENING BILAT (CPT=77067/66549)

## 2025-04-24 ENCOUNTER — OFFICE VISIT (OUTPATIENT)
Dept: INTERNAL MEDICINE CLINIC | Facility: CLINIC | Age: 60
End: 2025-04-24
Payer: COMMERCIAL

## 2025-04-24 VITALS
HEIGHT: 62 IN | WEIGHT: 235.38 LBS | HEART RATE: 100 BPM | SYSTOLIC BLOOD PRESSURE: 104 MMHG | DIASTOLIC BLOOD PRESSURE: 70 MMHG | BODY MASS INDEX: 43.32 KG/M2 | OXYGEN SATURATION: 97 %

## 2025-04-24 DIAGNOSIS — Z00.00 PHYSICAL EXAM: Primary | ICD-10-CM

## 2025-04-24 DIAGNOSIS — E66.813 OBESITY, CLASS III, BMI 40-49.9 (MORBID OBESITY): ICD-10-CM

## 2025-04-24 DIAGNOSIS — Z12.31 ENCOUNTER FOR SCREENING MAMMOGRAM FOR MALIGNANT NEOPLASM OF BREAST: ICD-10-CM

## 2025-04-24 DIAGNOSIS — E11.9 TYPE 2 DIABETES MELLITUS WITHOUT COMPLICATION, WITHOUT LONG-TERM CURRENT USE OF INSULIN (HCC): ICD-10-CM

## 2025-04-24 DIAGNOSIS — Z12.11 COLON CANCER SCREENING: ICD-10-CM

## 2025-04-24 DIAGNOSIS — E78.5 HYPERLIPIDEMIA, UNSPECIFIED HYPERLIPIDEMIA TYPE: ICD-10-CM

## 2025-04-24 DIAGNOSIS — R31.9 HEMATURIA, UNSPECIFIED TYPE: ICD-10-CM

## 2025-04-24 DIAGNOSIS — R80.9 PROTEINURIA, UNSPECIFIED TYPE: ICD-10-CM

## 2025-04-25 LAB
GLUCOSE: NEGATIVE
NITRITE: NEGATIVE
SPECIFIC GRAVITY: 1.03 (ref 1–1.03)

## 2025-04-26 DIAGNOSIS — E11.9 TYPE 2 DIABETES MELLITUS WITHOUT COMPLICATION, WITHOUT LONG-TERM CURRENT USE OF INSULIN (HCC): ICD-10-CM

## 2025-04-26 DIAGNOSIS — E66.813 OBESITY, CLASS III, BMI 40-49.9 (MORBID OBESITY): ICD-10-CM

## 2025-04-29 RX ORDER — TIRZEPATIDE 12.5 MG/.5ML
12.5 INJECTION, SOLUTION SUBCUTANEOUS WEEKLY
Qty: 6 ML | Refills: 1 | Status: SHIPPED | OUTPATIENT
Start: 2025-04-29

## 2025-04-29 NOTE — TELEPHONE ENCOUNTER
Future Appointment:8/28/25      Last Appointment:4/24/25      Last Refill:1/30/25      Medication Requested:   Requested Prescriptions     Pending Prescriptions Disp Refills    Tirzepatide (MOUNJARO) 12.5 MG/0.5ML Subcutaneous Solution Auto-injector 6 mL 1     Sig: Inject 12.5 mg into the skin once a week.

## 2025-05-05 ENCOUNTER — HOSPITAL ENCOUNTER (OUTPATIENT)
Dept: ULTRASOUND IMAGING | Facility: HOSPITAL | Age: 60
Discharge: HOME | End: 2025-05-05
Attending: FAMILY MEDICINE
Payer: COMMERCIAL

## 2025-05-05 DIAGNOSIS — R80.9 PROTEINURIA, UNSPECIFIED TYPE: ICD-10-CM

## 2025-05-05 PROCEDURE — 76775 US EXAM ABDO BACK WALL LIM: CPT | Performed by: FAMILY MEDICINE

## 2025-05-14 ENCOUNTER — PATIENT MESSAGE (OUTPATIENT)
Dept: INTERNAL MEDICINE CLINIC | Facility: CLINIC | Age: 60
End: 2025-05-14

## 2025-07-16 DIAGNOSIS — E66.813 OBESITY, CLASS III, BMI 40-49.9 (MORBID OBESITY): ICD-10-CM

## 2025-07-16 DIAGNOSIS — E11.9 TYPE 2 DIABETES MELLITUS WITHOUT COMPLICATION, WITHOUT LONG-TERM CURRENT USE OF INSULIN (HCC): ICD-10-CM

## 2025-07-22 ENCOUNTER — PATIENT MESSAGE (OUTPATIENT)
Dept: INTERNAL MEDICINE CLINIC | Facility: CLINIC | Age: 60
End: 2025-07-22

## 2025-07-22 DIAGNOSIS — E11.9 TYPE 2 DIABETES MELLITUS WITHOUT COMPLICATION, WITHOUT LONG-TERM CURRENT USE OF INSULIN (HCC): Primary | ICD-10-CM

## 2025-07-22 RX ORDER — TIRZEPATIDE 12.5 MG/.5ML
12.5 INJECTION, SOLUTION SUBCUTANEOUS WEEKLY
Qty: 6 ML | Refills: 1 | OUTPATIENT
Start: 2025-07-22

## 2025-07-23 PROBLEM — E11.9 TYPE 2 DIABETES MELLITUS WITHOUT COMPLICATION, WITHOUT LONG-TERM CURRENT USE OF INSULIN (HCC): Status: ACTIVE | Noted: 2025-07-23

## 2025-07-23 RX ORDER — TIRZEPATIDE 12.5 MG/.5ML
12.5 INJECTION, SOLUTION SUBCUTANEOUS WEEKLY
Qty: 2 ML | Refills: 0 | Status: SHIPPED | OUTPATIENT
Start: 2025-07-23 | End: 2025-08-14

## 2025-08-28 ENCOUNTER — OFFICE VISIT (OUTPATIENT)
Dept: INTERNAL MEDICINE CLINIC | Facility: CLINIC | Age: 60
End: 2025-08-28

## 2025-08-28 VITALS
DIASTOLIC BLOOD PRESSURE: 62 MMHG | WEIGHT: 211.38 LBS | HEIGHT: 62 IN | OXYGEN SATURATION: 98 % | BODY MASS INDEX: 38.9 KG/M2 | HEART RATE: 84 BPM | SYSTOLIC BLOOD PRESSURE: 96 MMHG

## 2025-08-28 DIAGNOSIS — E66.813 OBESITY, CLASS III, BMI 40-49.9 (MORBID OBESITY): ICD-10-CM

## 2025-08-28 DIAGNOSIS — K59.00 CONSTIPATION, UNSPECIFIED CONSTIPATION TYPE: ICD-10-CM

## 2025-08-28 DIAGNOSIS — E11.9 TYPE 2 DIABETES MELLITUS WITHOUT COMPLICATION, WITHOUT LONG-TERM CURRENT USE OF INSULIN (HCC): Primary | ICD-10-CM

## 2025-08-28 PROCEDURE — 99214 OFFICE O/P EST MOD 30 MIN: CPT | Performed by: FAMILY MEDICINE

## 2025-08-28 RX ORDER — METFORMIN HYDROCHLORIDE 500 MG/1
500 TABLET, EXTENDED RELEASE ORAL DAILY
Qty: 90 TABLET | Refills: 3 | Status: SHIPPED | OUTPATIENT
Start: 2025-08-28

## 2025-08-28 RX ORDER — TIRZEPATIDE 12.5 MG/.5ML
12.5 INJECTION, SOLUTION SUBCUTANEOUS WEEKLY
Qty: 6 ML | Refills: 1 | Status: SHIPPED | OUTPATIENT
Start: 2025-08-28

## (undated) NOTE — MR AVS SNAPSHOT
[facility] [employer] [place] [address] [phone]               Thank you for choosing us for your health care visit with Shila Greene MD.  We are glad to serve you and happy to provide you with this summary of your v Take 1 capsule (40 mg total) by mouth daily. What changed: You were already taking a medication with the same name, and this prescription was added. Make sure you understand how and when to take each.    Commonly known as:  VYVANSE           * Lisdexamfe For medical emergencies, dial 911.            Visit [facility] online at  [email or web address]

## (undated) NOTE — MR AVS SNAPSHOT
[facility] [employer] [place] [address] [phone]               Thank you for choosing us for your health care visit with Chad Rm MD.  We are glad to serve you and happy to provide you with this summary of your v You can get these medications from any pharmacy     Bring a paper prescription for each of these medications    - Lisdexamfetamine Dimesylate 40 MG Caps            Follow-up Instructions     Return in about 8 weeks (around 4/7/2017).          MyChart     Vi Get your heart pumping – brisk walking, biking, swimming Even 10 minute increments are effective and add up over the week   2 ½ hours per week – spread out over time Use a osmani to keep you motivated   Don’t forget strength training with weights and resist